# Patient Record
Sex: MALE | Race: WHITE | URBAN - NONMETROPOLITAN AREA
[De-identification: names, ages, dates, MRNs, and addresses within clinical notes are randomized per-mention and may not be internally consistent; named-entity substitution may affect disease eponyms.]

---

## 2023-03-15 ENCOUNTER — OFFICE VISIT (OUTPATIENT)
Dept: NEPHROLOGY | Age: 63
End: 2023-03-15
Payer: COMMERCIAL

## 2023-03-15 VITALS
WEIGHT: 227 LBS | DIASTOLIC BLOOD PRESSURE: 85 MMHG | OXYGEN SATURATION: 97 % | TEMPERATURE: 98 F | SYSTOLIC BLOOD PRESSURE: 138 MMHG | HEART RATE: 97 BPM

## 2023-03-15 DIAGNOSIS — F19.90 EXCESSIVE USE OF NONSTEROIDAL ANTI-INFLAMMATORY DRUG (NSAID): ICD-10-CM

## 2023-03-15 DIAGNOSIS — I12.9 HYPERTENSIVE RENAL DISEASE, STAGE 1 THROUGH STAGE 4 OR UNSPECIFIED CHRONIC KIDNEY DISEASE: ICD-10-CM

## 2023-03-15 DIAGNOSIS — N18.31 STAGE 3A CHRONIC KIDNEY DISEASE (HCC): Primary | ICD-10-CM

## 2023-03-15 DIAGNOSIS — N20.0 KIDNEY STONE: ICD-10-CM

## 2023-03-15 PROBLEM — E11.9 TYPE 2 DIABETES MELLITUS WITHOUT COMPLICATION, WITHOUT LONG-TERM CURRENT USE OF INSULIN (HCC): Status: ACTIVE | Noted: 2017-04-25

## 2023-03-15 PROBLEM — E65 LIPOHYPERTROPHY DUE TO INSULIN INJECTION: Status: ACTIVE | Noted: 2022-10-29

## 2023-03-15 PROBLEM — T80.89XA LIPOHYPERTROPHY DUE TO INSULIN INJECTION: Status: ACTIVE | Noted: 2022-10-29

## 2023-03-15 PROBLEM — K86.2 PANCREAS CYST: Status: ACTIVE | Noted: 2021-10-27

## 2023-03-15 PROBLEM — M96.1 POSTLAMINECTOMY SYNDROME, LUMBAR REGION: Status: ACTIVE | Noted: 2022-09-26

## 2023-03-15 PROBLEM — G47.01 INSOMNIA SECONDARY TO CHRONIC PAIN: Status: ACTIVE | Noted: 2022-09-26

## 2023-03-15 PROBLEM — M79.2 NEURALGIA: Status: ACTIVE | Noted: 2022-11-07

## 2023-03-15 PROBLEM — M19.90 OSTEOARTHROSIS: Status: ACTIVE | Noted: 2017-04-25

## 2023-03-15 PROBLEM — K21.9 GASTROESOPHAGEAL REFLUX DISEASE: Status: ACTIVE | Noted: 2022-09-28

## 2023-03-15 PROBLEM — N18.30 STAGE 3 CHRONIC KIDNEY DISEASE (HCC): Status: ACTIVE | Noted: 2022-10-10

## 2023-03-15 PROBLEM — G89.29 INSOMNIA SECONDARY TO CHRONIC PAIN: Status: ACTIVE | Noted: 2022-09-26

## 2023-03-15 PROCEDURE — 3079F DIAST BP 80-89 MM HG: CPT | Performed by: INTERNAL MEDICINE

## 2023-03-15 PROCEDURE — 99204 OFFICE O/P NEW MOD 45 MIN: CPT | Performed by: INTERNAL MEDICINE

## 2023-03-15 PROCEDURE — 3075F SYST BP GE 130 - 139MM HG: CPT | Performed by: INTERNAL MEDICINE

## 2023-03-15 RX ORDER — BLOOD-GLUCOSE TRANSMITTER
EACH MISCELLANEOUS
COMMUNITY
Start: 2023-02-01

## 2023-03-15 RX ORDER — TAMSULOSIN HYDROCHLORIDE 0.4 MG/1
CAPSULE ORAL
COMMUNITY
Start: 2021-12-30

## 2023-03-15 RX ORDER — M-VIT,TX,IRON,MINS/CALC/FOLIC 27MG-0.4MG
1 TABLET ORAL DAILY
COMMUNITY

## 2023-03-15 RX ORDER — MELOXICAM 7.5 MG/1
TABLET ORAL
COMMUNITY
Start: 2023-01-26

## 2023-03-15 RX ORDER — GLIMEPIRIDE 2 MG/1
TABLET ORAL
COMMUNITY
Start: 2022-08-08

## 2023-03-15 RX ORDER — AMLODIPINE BESYLATE 5 MG/1
TABLET ORAL
COMMUNITY
Start: 2023-02-20

## 2023-03-15 RX ORDER — BENAZEPRIL HYDROCHLORIDE 20 MG/1
TABLET ORAL DAILY
COMMUNITY
Start: 2023-02-08

## 2023-03-15 RX ORDER — BLOOD-GLUCOSE SENSOR
EACH MISCELLANEOUS
COMMUNITY
Start: 2023-03-01

## 2023-03-15 RX ORDER — GABAPENTIN 300 MG/1
CAPSULE ORAL 3 TIMES DAILY
COMMUNITY

## 2023-03-15 RX ORDER — ATORVASTATIN CALCIUM 10 MG/1
TABLET, FILM COATED ORAL DAILY
COMMUNITY

## 2023-03-15 RX ORDER — OXYCODONE HYDROCHLORIDE AND ACETAMINOPHEN 5; 325 MG/1; MG/1
TABLET ORAL
COMMUNITY
Start: 2023-02-18

## 2023-03-15 RX ORDER — LEVOTHYROXINE SODIUM 0.07 MG/1
TABLET ORAL DAILY
COMMUNITY
Start: 2021-11-04

## 2023-03-15 RX ORDER — INSULIN DETEMIR 100 [IU]/ML
INJECTION, SOLUTION SUBCUTANEOUS 2 TIMES DAILY
COMMUNITY
Start: 2023-02-08

## 2023-03-15 RX ORDER — PANTOPRAZOLE SODIUM 40 MG/1
TABLET, DELAYED RELEASE ORAL
COMMUNITY
Start: 2021-12-30

## 2023-03-15 RX ORDER — POTASSIUM CITRATE 10 MEQ/1
TABLET, EXTENDED RELEASE ORAL
COMMUNITY
Start: 2022-12-13

## 2023-03-15 RX ORDER — DULAGLUTIDE 3 MG/.5ML
INJECTION, SOLUTION SUBCUTANEOUS
COMMUNITY
Start: 2023-03-06

## 2023-03-15 RX ORDER — INSULIN LISPRO 100 [IU]/ML
INJECTION, SOLUTION INTRAVENOUS; SUBCUTANEOUS
COMMUNITY
Start: 2023-02-08

## 2023-03-15 NOTE — PROGRESS NOTES
[address] [facility] [address]  Dept: [phone]  Loc: [phone]  Outpatient Consult Note  3/15/2023 3:40 PM        Pt Name:    Juan Carlos Ken:    1960  Primary Care Physician:  Milton Quinteros     Chief Complaint:   Chief Complaint   Patient presents with    New Patient     Dr. Liliana Pro renal dysfunction        Background Information/HPI   The patient is a 60 y.o. with hx HTN, DM who is here for initial evaluation of renal dysfunction. Patient reports hx kidney stones s/p lithotripsy. He has had ureteral stents and saw urology in the past. Has DM for 16+ years. No hx known heart problems. No hx smoking. No hx CVA. He has hx pancreas cyst- [facility]. No recent hematuria. Has some leg swelling. Was drinking some beers in the past but no longer. He has had radiation therapy for ?cancer in the past- but unable to recall details at this time. No NSAID intake. He works at [employer]. He takes potassium citrate 10 meq 4 tabs BID. He says he takes potassium citrate --has been taking it since 1990s ever since he had kidney stones surgery. Also taken lotensin, norvasc. Also takes mobic daily. He says he started taking it about a month for arthritis. Has lot of arthritis in hands. Allergies:  Patient has no known allergies.         Past Medical History:  Past Medical History:   Diagnosis Date    Cancer ([facility] [address]     Chronic kidney disease     Diabetes mellitus ([facility] [address]     Hypertension         Past Surgical History:  Past Surgical History:   Procedure Laterality Date    BACK SURGERY      CARPAL TUNNEL RELEASE      CERVICAL FUSION      EAR SURGERY Left     x4    FINGER TRIGGER RELEASE      PERCUTANEOUS NEPHROLITHOTRIPSY          Family History:  Family History   Problem Relation Age of Onset    Cancer Mother     Cancer Father     Cancer Maternal Aunt     Cancer Maternal Uncle     Cancer Paternal Uncle     Cancer Maternal Grandmother Cancer Maternal Grandfather         Social History:  Social History     Socioeconomic History    Marital status:      Spouse name: Not on file    Number of children: Not on file    Years of education: Not on file    Highest education level: Not on file   Occupational History    Not on file   Tobacco Use    Smoking status: Never    Smokeless tobacco: Never   Substance and Sexual Activity    Alcohol use: Not on file    Drug use: Not on file    Sexual activity: Not on file   Other Topics Concern    Not on file   Social History Narrative    Not on file     Social Determinants of Health     Financial Resource Strain: Not on file   Food Insecurity: Not on file   Transportation Needs: Not on file   Physical Activity: Not on file   Stress: Not on file   Social Connections: Not on file   Intimate Partner Violence: Not on file   Housing Stability: Not on file        Review of Systems:  Constitutional: no fever or chills  Head: No headaches  Eyes: no blurry vision, no discharge  Ears: no ear pain or hearing changes  Nose: no runny nose or epistaxis  Respiratory: no shortness of breath or cough or sputum production  Cardiovascular: no chest pain  GI: no nausea, no vomiting or diarrhea  : denies any discharge  Skin: no rash  Musculoskeletal: + arthritis pain,  moves all ext  Neuro: no numbness or tingling, no slurred speech  Psychiatric: stable mood, no depression or insomnia    All other review of systems were reviewed and negative     Home Medications:  Prior to Admission medications    Medication Sig Start Date End Date Taking?  Authorizing Provider   amLODIPine (NORVASC) 5 MG tablet TAKE 1 TABLET BY MOUTH EVERY DAY 2/20/23  Yes Historical Provider, MD   atorvastatin (LIPITOR) 10 MG tablet Take by mouth daily   Yes Historical Provider, MD   benazepril (LOTENSIN) 20 MG tablet Take by mouth daily 2/8/23  Yes Historical Provider, MD   Calcium 250 MG CAPS Take by mouth   Yes Historical Provider, MD   Continuous Blood Gluc Sensor (DEXCOM G6 SENSOR) MISC  3/1/23  Yes Historical Provider, MD   Continuous Blood Gluc Transmit (DEXCOM G6 TRANSMITTER) MISC  2/1/23  Yes Historical Provider, MD   gabapentin (NEURONTIN) 300 MG capsule Take by mouth 3 times daily. Yes Historical Provider, MD   glimepiride (AMARYL) 2 MG tablet Two and one half tabs bid 8/8/22  Yes Historical Provider, MD   insulin lispro, 1 Unit Dial, (HUMALOG/ADMELOG) 100 UNIT/ML SOPN Take 10 units plus medium dose sliding scale (0-12units). Max 60 units.  2/8/23  Yes Historical Provider, MD   empagliflozin (JARDIANCE) 25 MG tablet TAKE 1 TABLET BY MOUTH EVERY DAY 3/18/22  Yes Historical Provider, MD   insulin detemir (LEVEMIR FLEXTOUCH) 100 UNIT/ML injection pen Inject into the skin 2 times daily 2/8/23  Yes Historical Provider, MD   meloxicam (MOBIC) 7.5 MG tablet TAKE 1 TABLET BY MOUTH EVERY DAY FOR 90 DAYS 1/26/23  Yes Historical Provider, MD   oxyCODONE-acetaminophen (PERCOCET) 5-325 MG per tablet TAKE 1 TABLET BY MOUTH EVERY 8 HOURS AS NEEDED FOR PAIN *MAY FILL 2/10* 2/18/23  Yes Historical Provider, MD   pantoprazole (PROTONIX) 40 MG tablet TAKE 1 TABLET DAILY 12/30/21  Yes Historical Provider, MD   potassium citrate (UROCIT-K) 10 MEQ (1080 MG) extended release tablet TAKE 4 TABLETS TWICE A DAY 12/13/22  Yes Historical Provider, MD   tamsulosin (FLOMAX) 0.4 MG capsule TAKE 1 CAPSULE DAILY 12/30/21  Yes Historical Provider, MD   TRULICITY 3 ZD/9.2MR SOPN INJECT 3 MG UNDER THE SKIN ONCE A WEEK. 3/6/23  Yes Historical Provider, MD   Multiple Vitamins-Minerals (THERAPEUTIC MULTIVITAMIN-MINERALS) tablet Take 1 tablet by mouth daily   Yes Historical Provider, MD   levothyroxine (SYNTHROID) 75 MCG tablet Take by mouth daily 11/4/21  Yes Historical Provider, MD        Physical Examination:  VITALS:  /85 (Site: Left Upper Arm, Position: Sitting, Cuff Size: Medium Adult)   Pulse 97   Temp 98 °F (36.7 °C)   Wt 227 lb (103 kg)   SpO2 97%   There is no height or weight on file to calculate BMI. Wt Readings from Last 3 Encounters:   03/15/23 227 lb (103 kg)     Constitutional and General Appearance: alert and cooperative with exam, appears comfortable, no distress, not diaphoretic  Eyes: no icteric sclera in left eye or right eye,  no pallor conjunctiva in left or right eye, no discharge seen from left eye or right eye  Ears and Nose: normal external appearance of left and right ear  Oral: moist oral mucus membranes  Neck: No jugular venous distention  Lungs: Air entry B/L, no crackles or rales  Chest: No chest wall tenderness  Heart: regular rate, S1, S2  Extremities: no pitting LE edema, no tenderness  GI: soft, non-tender, no guarding, no distention  Skin: no rash seen on exposed extremities  Musculo: moves all extremities  Neuro: no slurred speech, no facial drooping  Psychiatric: Normal mood and affect, Not agitated     Laboratory & Diagnostics:  Old progress notes from referring physician reviewed. Radiology/US kidneys:   Echo:   Old labs reviewed:  October 2022: AIC 10.2% (was over 12% in the past), creat 1.7  Feb 2023: Creat 1.8, K 5.0     Impression/Plan:   1. CKD IIIa: in setting of HTN/DM and NSAID use. He has been on mobic. He is on insulin for DM. He is on norvasc and lotensin. Advised good sugar control and BP control to minimize risk of CKD progression. Discussed in detail with patient. He says sugars are in 200 range at times. Advised weight loss. Advised low salt diet. Says sugars have been in 400-500 range in the past. Check UA, UPCR and kidney U/S to check echogenicity and kidney size. 2. HTN: on norvasc and lotensin. Advised low salt diet, weight loss  3. IDDM: on jardiance, amaryl, insulin  4 Hx kidney stones: check kidney U/S, 24 hr litholink, advised low salt diet. Advised low red meat. Increase water intake. Advised weight loss. Check uric acid. He is on potassium citrate  5. Pancreatic cyst  6. NSAID intake: discussed renal side effects.  He has cut back on mobic to 7.5 mg daily. Discussed in detail, minimize use as much possible. Thought process was discussed with the patient  Thank you for the referral  Please do not hesitate to contact me if you have any questions or concerns  I will make further recommendations depending on clinical course and lab/diagnostic results    Orders Placed This Encounter   Procedures    US RENAL COMPLETE    Basic Metabolic Panel    CBC    PTH, Intact    Phosphorus    Uric Acid    Vitamin D 25 Hydroxy    Urinalysis    Protein / creatinine ratio, urine    LITHOLINK     Return in about 2 months (around 5/15/2023).       Manohar Morse MD  [facility]

## 2023-03-28 DIAGNOSIS — N18.31 STAGE 3A CHRONIC KIDNEY DISEASE (HCC): ICD-10-CM

## 2023-05-31 ENCOUNTER — OFFICE VISIT (OUTPATIENT)
Dept: NEPHROLOGY | Age: 63
End: 2023-05-31
Payer: COMMERCIAL

## 2023-05-31 VITALS
WEIGHT: 226 LBS | SYSTOLIC BLOOD PRESSURE: 107 MMHG | BODY MASS INDEX: 36.48 KG/M2 | DIASTOLIC BLOOD PRESSURE: 75 MMHG | HEART RATE: 84 BPM | OXYGEN SATURATION: 95 %

## 2023-05-31 DIAGNOSIS — N18.31 CHRONIC KIDNEY DISEASE, STAGE 3A (HCC): Primary | ICD-10-CM

## 2023-05-31 DIAGNOSIS — I12.9 HYPERTENSIVE RENAL DISEASE, STAGE 1 THROUGH STAGE 4 OR UNSPECIFIED CHRONIC KIDNEY DISEASE: ICD-10-CM

## 2023-05-31 PROCEDURE — 3078F DIAST BP <80 MM HG: CPT | Performed by: INTERNAL MEDICINE

## 2023-05-31 PROCEDURE — 3074F SYST BP LT 130 MM HG: CPT | Performed by: INTERNAL MEDICINE

## 2023-05-31 PROCEDURE — 99214 OFFICE O/P EST MOD 30 MIN: CPT | Performed by: INTERNAL MEDICINE

## 2023-05-31 RX ORDER — POTASSIUM CITRATE 10 MEQ/1
TABLET, EXTENDED RELEASE ORAL
Qty: 720 TABLET | Refills: 3 | Status: CANCELLED | OUTPATIENT
Start: 2023-05-31

## 2023-05-31 RX ORDER — POTASSIUM CITRATE 10 MEQ/1
20 TABLET, EXTENDED RELEASE ORAL 2 TIMES DAILY
Qty: 120 TABLET | Refills: 3 | Status: SHIPPED | OUTPATIENT
Start: 2023-05-31

## 2023-05-31 RX ORDER — TAMSULOSIN HYDROCHLORIDE 0.4 MG/1
0.4 CAPSULE ORAL DAILY
Qty: 90 CAPSULE | Refills: 3 | Status: CANCELLED | OUTPATIENT
Start: 2023-05-31

## 2023-05-31 RX ORDER — FUROSEMIDE 20 MG/1
20 TABLET ORAL DAILY
Qty: 90 TABLET | Refills: 3 | Status: SHIPPED | OUTPATIENT
Start: 2023-05-31

## 2023-05-31 NOTE — PROGRESS NOTES
[address] [facility] [address] [place] [address]  Dept: [phone]  Loc: [phone]  Office Progress Note  5/31/2023 3:34 PM      Pt Name:    Fady Blue:    1960  Primary Care Physician:  Clarissa Crawford     Chief Complaint:   Chief Complaint   Patient presents with    Follow-up     2 months follow-up for CKD and HTN, DM        Background Information/Interval History:   The patient is a 60 y.o. with hx HTN, DM who is here for follow-up evaluation of renal dysfunction. Patient reports hx kidney stones s/p lithotripsy. He has had ureteral stents and saw urology in the past. Has DM for 16+ years. He has hx pancreas cyst- [facility]. Was drinking some beers in the past but no longer. He has had radiation therapy for ?cancer (oral) in the past- but unable to recall details at this time. He takes potassium citrate 10 meq 4 tabs BID. He says he takes potassium citrate --has been taking it since 1990s ever since he had kidney stones surgery. Also taken lotensin, norvasc. Also takes mobic daily. He says he started taking it about a month for arthritis. Has lot of arthritis in hands. here for 2 months follow-up. Says his sugars were running high due to not able to eat properly- wife was ill. BP is stable. Reports mild leg swelling.       Past History:  Past Medical History:   Diagnosis Date    Cancer ([facility] [address]     Chronic kidney disease     Diabetes mellitus ([address]     Hypertension      Past Surgical History:   Procedure Laterality Date    BACK SURGERY      CARPAL TUNNEL RELEASE      CERVICAL FUSION      EAR SURGERY Left     x4    FINGER TRIGGER RELEASE      PERCUTANEOUS NEPHROLITHOTRIPSY          VITALS:  /75 (Site: Right Upper Arm, Position: Sitting, Cuff Size: Large Adult)   Pulse 84   Wt 226 lb (102.5 kg)   SpO2 95%   BMI 36.48 kg/m²   Wt Readings from Last 3 Encounters:   05/31/23 226 lb (102.5 kg)   03/20/23 225 lb 9.6

## 2023-07-03 RX ORDER — POTASSIUM CITRATE 10 MEQ/1
20 TABLET, EXTENDED RELEASE ORAL 2 TIMES DAILY
Qty: 120 TABLET | Refills: 5 | Status: SHIPPED | OUTPATIENT
Start: 2023-07-03

## 2023-10-27 LAB
PTH INTACT: 45
PTH INTACT: NORMAL

## 2023-12-06 ENCOUNTER — OFFICE VISIT (OUTPATIENT)
Dept: NEPHROLOGY | Age: 63
End: 2023-12-06
Payer: COMMERCIAL

## 2023-12-06 VITALS
BODY MASS INDEX: 37.93 KG/M2 | WEIGHT: 236 LBS | SYSTOLIC BLOOD PRESSURE: 123 MMHG | DIASTOLIC BLOOD PRESSURE: 74 MMHG | OXYGEN SATURATION: 97 % | HEIGHT: 66 IN | HEART RATE: 94 BPM

## 2023-12-06 DIAGNOSIS — F19.90 EXCESSIVE USE OF NONSTEROIDAL ANTI-INFLAMMATORY DRUG (NSAID): ICD-10-CM

## 2023-12-06 DIAGNOSIS — N18.32 CHRONIC KIDNEY DISEASE, STAGE 3B (HCC): Primary | ICD-10-CM

## 2023-12-06 DIAGNOSIS — I12.9 HYPERTENSIVE RENAL DISEASE, STAGE 1 THROUGH STAGE 4 OR UNSPECIFIED CHRONIC KIDNEY DISEASE: ICD-10-CM

## 2023-12-06 PROCEDURE — 3074F SYST BP LT 130 MM HG: CPT | Performed by: INTERNAL MEDICINE

## 2023-12-06 PROCEDURE — 3078F DIAST BP <80 MM HG: CPT | Performed by: INTERNAL MEDICINE

## 2023-12-06 PROCEDURE — 99214 OFFICE O/P EST MOD 30 MIN: CPT | Performed by: INTERNAL MEDICINE

## 2023-12-06 RX ORDER — FUROSEMIDE 40 MG/1
40 TABLET ORAL DAILY
Qty: 90 TABLET | Refills: 3 | Status: SHIPPED | OUTPATIENT
Start: 2023-12-06

## 2023-12-06 NOTE — PROGRESS NOTES
Return in about 6 months (around 6/6/2024).       Vincent Mccrary MD  [facility]

## 2023-12-13 ENCOUNTER — OFFICE VISIT (OUTPATIENT)
Age: 63
End: 2023-12-13
Payer: COMMERCIAL

## 2023-12-13 VITALS
BODY MASS INDEX: 38.54 KG/M2 | DIASTOLIC BLOOD PRESSURE: 89 MMHG | HEART RATE: 78 BPM | SYSTOLIC BLOOD PRESSURE: 138 MMHG | HEIGHT: 66 IN | WEIGHT: 239.8 LBS | RESPIRATION RATE: 16 BRPM

## 2023-12-13 DIAGNOSIS — E11.65 TYPE 2 DIABETES MELLITUS WITH HYPERGLYCEMIA, WITHOUT LONG-TERM CURRENT USE OF INSULIN (HCC): Primary | ICD-10-CM

## 2023-12-13 PROCEDURE — 99215 OFFICE O/P EST HI 40 MIN: CPT | Performed by: INTERNAL MEDICINE

## 2023-12-13 PROCEDURE — 3079F DIAST BP 80-89 MM HG: CPT | Performed by: INTERNAL MEDICINE

## 2023-12-13 PROCEDURE — 3046F HEMOGLOBIN A1C LEVEL >9.0%: CPT | Performed by: INTERNAL MEDICINE

## 2023-12-13 PROCEDURE — 3075F SYST BP GE 130 - 139MM HG: CPT | Performed by: INTERNAL MEDICINE

## 2023-12-13 PROCEDURE — 95251 CONT GLUC MNTR ANALYSIS I&R: CPT | Performed by: INTERNAL MEDICINE

## 2023-12-13 RX ORDER — POTASSIUM CITRATE 10 MEQ/1
20 TABLET, EXTENDED RELEASE ORAL 2 TIMES DAILY
Qty: 120 TABLET | Refills: 5 | Status: CANCELLED | OUTPATIENT
Start: 2023-12-13

## 2023-12-13 RX ORDER — INSULIN LISPRO 100 [IU]/ML
INJECTION, SOLUTION INTRAVENOUS; SUBCUTANEOUS
Qty: 45 ADJUSTABLE DOSE PRE-FILLED PEN SYRINGE | Refills: 3 | Status: CANCELLED | OUTPATIENT
Start: 2023-12-13

## 2023-12-13 RX ORDER — GLIMEPIRIDE 4 MG/1
4 TABLET ORAL 2 TIMES DAILY
Qty: 180 TABLET | Refills: 1 | Status: SHIPPED | OUTPATIENT
Start: 2023-12-13

## 2023-12-13 RX ORDER — LEVOTHYROXINE SODIUM 0.07 MG/1
75 TABLET ORAL DAILY
Qty: 90 TABLET | Refills: 1 | Status: SHIPPED | OUTPATIENT
Start: 2023-12-13

## 2023-12-13 NOTE — PATIENT INSTRUCTIONS
Take LEVEMIR 96 units twice a day     Take mealtime HUMALOG as follows:  Breakfast: 44 units  Lunch: 44 units  Dinner: 44 units     Plus sliding scale HUMALOG as follows:  Below 70, treat for hypoglycemia  , no additional insulin  151-200, 5 units  201-250, 10 units  251-300, 15 units  301-350, 20 units  351-400, 25 units  Above 400, call MD     Send blood sugars in 1-2 weeks.

## 2023-12-13 NOTE — PROGRESS NOTES
CGMSINTERPRETATION    CGMS interpretation:  The patient is checking blood sugars 4 times a day using Dexcom continuous glucose monitoring system. His download includes data from 11/30/2023 through 12/13/2023 and is sufficient for interpretation. The CGM was active 99.7% of the time. Average blood glucose is 291 mg/dL with a coefficient of variation of 32.0% and glucose management indicator of 10.3%  The patient was within the target range 17 percent of the time. Hypoglycemia:  Blood glucose range between 54 mg/dL and 70mg/dL: 0%  Blood glucose is below 54 mg/dL: 0%  Hyperglycemia:  Blood glucoses between 181 and 250 mg/dL: 17%  Blood glucoses above 250 mg/dL: 66%  In summary, this patient is having hyperglycemia all day. Recommendations: Please refer to the assessment and plan section.
Take LEVEMIR 96 units twice a day     Take mealtime HUMALOG as follows:  Breakfast: 44 units  Lunch: 44 units  Dinner: 44 units     Plus sliding scale HUMALOG as follows:  Below 70, treat for hypoglycemia  , no additional insulin  151-200, 5 units  201-250, 10 units  251-300, 15 units  301-350, 20 units  351-400, 25 units  Above 400, call MD     Send blood sugars in 1-2 weeks.
diabetes mellitus with hyperglycemia, without long-term current use of insulin (HCC)  Uncontrolled. I have converted him to U200 Humalog and adjusted the doses. Blood sugars in 2 weeks. Hypoglycemia treatment discussed. I have given him a referral to the diabetes management clinic. If needed we will transition him to U-500 as the neck step. We could consider using concentrated insulin in pump therapy off label. At the same time I have spoken with his gastroenterologist at [facility] who feels that GLP-1 agonist treatment is still reasonable in spite of the pancreatic problem. This can be initiated after review of his blood sugar and response to changes from today. Time: It took me a total of 44 minutes to complete previsit chart review, face-to-face encounter, medical decision making, care coordination, order placement and charting. This does not include the time spent on CGMS interpretation. Orders Placed This Encounter   Procedures    AMB REFERRAL TO DIABETES CLINIC     Referral Priority:   Routine     Referral Type:   Eval and Treat     Referral Reason:   Specialty Services Required     Referred to Provider:   Jourdan Morgan RN     Number of Visits Requested:   1         The risks and benefits of my recommendations, as well as other treatment options were discussed with the patient today. Questions were answered. Follow up: 3 month and as needed. Electronically signed by Carol Burnett MD 12/13/2023 10:59 AM     **This report has been created using voice recognition software. It may   contain minor errors which are inherent in voice recognition technology. **

## 2024-01-09 DIAGNOSIS — E11.65 TYPE 2 DIABETES MELLITUS WITH HYPERGLYCEMIA, WITHOUT LONG-TERM CURRENT USE OF INSULIN (HCC): ICD-10-CM

## 2024-01-16 DIAGNOSIS — E11.65 TYPE 2 DIABETES MELLITUS WITH HYPERGLYCEMIA, WITHOUT LONG-TERM CURRENT USE OF INSULIN (HCC): ICD-10-CM

## 2024-01-18 ENCOUNTER — TELEPHONE (OUTPATIENT)
Age: 64
End: 2024-01-18

## 2024-01-18 DIAGNOSIS — E11.65 TYPE 2 DIABETES MELLITUS WITH HYPERGLYCEMIA, WITHOUT LONG-TERM CURRENT USE OF INSULIN (HCC): ICD-10-CM

## 2024-01-18 NOTE — TELEPHONE ENCOUNTER
Patient called in regarding humalog refill, this was sent to [facility] rx on 1/9. He does not use this pharmacy anymore and request it be [facility] [place]. Can you please [facility] for patient?

## 2024-01-31 ENCOUNTER — OFFICE VISIT (OUTPATIENT)
Dept: INTERNAL MEDICINE CLINIC | Age: 64
End: 2024-01-31
Payer: COMMERCIAL

## 2024-01-31 VITALS
BODY MASS INDEX: 38.31 KG/M2 | TEMPERATURE: 98.2 F | DIASTOLIC BLOOD PRESSURE: 62 MMHG | HEIGHT: 66 IN | WEIGHT: 238.4 LBS | HEART RATE: 72 BPM | SYSTOLIC BLOOD PRESSURE: 114 MMHG

## 2024-01-31 DIAGNOSIS — E11.9 TYPE 2 DIABETES MELLITUS WITHOUT COMPLICATION, WITHOUT LONG-TERM CURRENT USE OF INSULIN (HCC): Primary | ICD-10-CM

## 2024-01-31 LAB — HBA1C MFR BLD: 11.1 % (ref 4.3–5.7)

## 2024-01-31 PROCEDURE — 83036 HEMOGLOBIN GLYCOSYLATED A1C: CPT | Performed by: REGISTERED NURSE

## 2024-01-31 PROCEDURE — NBSRV NON-BILLABLE SERVICE: Performed by: REGISTERED NURSE

## 2024-01-31 PROCEDURE — G0108 DIAB MANAGE TRN  PER INDIV: HCPCS | Performed by: REGISTERED NURSE

## 2024-01-31 RX ORDER — KETOCONAZOLE 20 MG/G
1 CREAM TOPICAL DAILY
COMMUNITY
Start: 2023-10-05

## 2024-01-31 RX ORDER — FLUOROURACIL 50 MG/G
CREAM TOPICAL 2 TIMES DAILY
COMMUNITY

## 2024-01-31 RX ORDER — ATORVASTATIN CALCIUM 20 MG/1
20 TABLET, FILM COATED ORAL DAILY
COMMUNITY
Start: 2023-11-26

## 2024-01-31 RX ORDER — KETOCONAZOLE 20 MG/ML
SHAMPOO TOPICAL DAILY
COMMUNITY
Start: 2023-10-05

## 2024-01-31 ASSESSMENT — PATIENT HEALTH QUESTIONNAIRE - PHQ9
1. LITTLE INTEREST OR PLEASURE IN DOING THINGS: 1
2. FEELING DOWN, DEPRESSED OR HOPELESS: 0
SUM OF ALL RESPONSES TO PHQ QUESTIONS 1-9: 1
SUM OF ALL RESPONSES TO PHQ9 QUESTIONS 1 & 2: 1
SUM OF ALL RESPONSES TO PHQ QUESTIONS 1-9: 1

## 2024-01-31 NOTE — PATIENT INSTRUCTIONS
Capsaicin cream (pepper cream) could help keep foot/                 feet pain  Try to get 10 minutes aerobic exercise every day            Damaso Pham 10 minute chair workout  Keep track or what you are eating           Limit a snack to a single carbohydrate                   1 handful chips                   5 Triscuits                   1 handful nuts                 *15 grams carbohydrate  Focus on limiting carbohydrates at meals to about                  3 servings (45-50 grams)                   Limit snacks to only 1 serving carb or 15 grams              Plus protein/ low carb vegetables  We will talk to Dr. Gustafson about the Trulicity

## 2024-01-31 NOTE — PROGRESS NOTES
[facility]  [address] [place] [address]  [phone] (phone)  [phone] (fax)    Patient ID: Rich Grayson 1960  Referring Provider: Dr. Gustafson     Diabetes Mellitus Type 2, Initial Visit: Patient here for an initial evaluation of Type 2 diabetes mellitus. Last c-peptide was 6/30/2024  = 5.59.  Current symptoms/problems include polyuria and neuropathy in feet bilat and have been unchanged. Symptoms have been present for several months.    The patient was initially diagnosed with Type 2 diabetes mellitus since 2007.    Known diabetic complications: peripheral neuropathy  Cardiovascular risk factors: advanced age (older than 55 for men, 65 for women), diabetes mellitus, dyslipidemia, hypertension, male gender, obesity (BMI >= 30 kg/m2), and sedentary lifestyle  Family history of diabetes: mat grandfather; brother  Weight trend: up 25 pounds after trigger finger surgery/ achiles heal repair and wife being sick for 1-2 months    Current Diabetes Pharmacotherapy:  Levemir 96 units morning and night  Humalog 44 with each meal plus group 5 scale  Glimepiride 4mg 2 times daily  Jardiance 25mg daily  Injection technique/storage: appropriate  stomach--yes    Glucose Trends:   Glucose at 1 hrs PPD today resulted at 397mg/dl  Current monitoring regimen: Dexcom CGM - checks 4+ times daily  Home blood sugar trends:    -V. High: 63%, High: 23%, Target: 14%, Low: 0%, V. Low: 0%   -Average glucose: 293mg/dL; GMI: --%; Sensor usage 79%   -levels drop closet to goal by 4am until 8am. Levels then rise   Any episodes of hypoglycemia? no    Lifestyle Factors:   Previous visit with dietician: remote  Current diet: B: 3 pieces butter toast            L: 1 biscuit/ gravy  Humalog 65 units                       D: large  salad/ 3 T. Dressing; ham; egg/ veggies                       Snacks: rare triscuits or nuts n cheese                       Beverages: coffee 3 cups per day (half n half)  Current

## 2024-02-01 ENCOUNTER — TELEPHONE (OUTPATIENT)
Dept: INTERNAL MEDICINE CLINIC | Age: 64
End: 2024-02-01

## 2024-02-01 ENCOUNTER — CLINICAL DOCUMENTATION (OUTPATIENT)
Age: 64
End: 2024-02-01

## 2024-02-01 DIAGNOSIS — E11.65 TYPE 2 DIABETES MELLITUS WITH HYPERGLYCEMIA, WITHOUT LONG-TERM CURRENT USE OF INSULIN (HCC): Primary | ICD-10-CM

## 2024-02-01 RX ORDER — DULAGLUTIDE 1.5 MG/.5ML
1.5 INJECTION, SOLUTION SUBCUTANEOUS WEEKLY
Qty: 4 ADJUSTABLE DOSE PRE-FILLED PEN SYRINGE | Refills: 0 | Status: SHIPPED | OUTPATIENT
Start: 2024-02-01

## 2024-02-01 RX ORDER — DULAGLUTIDE 0.75 MG/.5ML
0.75 INJECTION, SOLUTION SUBCUTANEOUS WEEKLY
Qty: 2 ADJUSTABLE DOSE PRE-FILLED PEN SYRINGE | Refills: 0 | Status: SHIPPED | OUTPATIENT
Start: 2024-02-01

## 2024-02-01 NOTE — TELEPHONE ENCOUNTER
Current Diabetes Pharmacotherapy:  Levemir 96 units morning and night  Humalog U200   44 with each meal plus group 5 scale  Glimepiride 4mg 2 times daily  Jardiance 25mg daily  Injection technique/storage: appropriate  stomach--yes  Trulicity 3mg weekly --has not taken for 2-3 months     Glucose Trends:   Glucose at 1 hrs PPD today resulted at 397mg/dl  Current monitoring regimen: Dexcom CGM - checks 4+ times daily  Home blood sugar trends:               -V. High: 63%, High: 23%, Target: 14%, Low: 0%, V. Low: 0%              -Average glucose: 293mg/dL; GMI: --%; Sensor usage 79%              -levels drop closet to goal by 4am until 8am. Levels then rise   Any episodes of hypoglycemia? no    A1C 1/31/2024 11.1%    Garcia was seen in the clinic 1/31/2024. He is very anxious about his high blood sugars and getting the Trulicity back in place. He reports that the gastroenterologist specialist has ok'd the return to Trulicity therapy that he had been taking. Also noted in Dr. Gustafson's note that he will likely proceed with this plan.  Garcia was on Trulicity 3mg weekly and wants to resume the Trulicity at this dose. We reviewed the normal titration and possible pancreatic risks with resuming at the advanced dose. He is adamant that 3mg is where he started before and wants to start again. We could consider a rapid titration that would possibly appease him more and maintain safety.  *Garcia has several months of 3mg Trulicity in his fridge, lending to his desire to get to the               3mg dose quicker.    Titration possibility:  Trulicity 1.5mg for 2 weeks; double 3rd/4th dose; proceed to 3mg weekly.                Or Trulicity 1.5mg for 4 weeks and then increase 3mg weekly.    Dr. Gustafson,      Please authorize the Diabetes Clinic at [facility] to teach/ assist Garcia to resume  his Trulicity therapy. If you agree, would you authorize starting at 1.5mg weekly and would you be willing to titrate up on the 3rd week or prefer to

## 2024-02-01 NOTE — PROGRESS NOTES
I spoke with patient regarding restarting Trulicity.  This has been cleared by his gastroenterologist at [facility].  We will start Trulicity 0.75 mg weekly x 2 doses, followed by 1.5 mg x 4 doses before going back to 3 mg weekly.  I have sent the prescriptions.

## 2024-02-06 DIAGNOSIS — E11.65 TYPE 2 DIABETES MELLITUS WITH HYPERGLYCEMIA, WITHOUT LONG-TERM CURRENT USE OF INSULIN (HCC): ICD-10-CM

## 2024-02-07 DIAGNOSIS — N18.32 CHRONIC KIDNEY DISEASE, STAGE 3B (HCC): Primary | ICD-10-CM

## 2024-02-07 DIAGNOSIS — E87.5 HYPERKALEMIA: ICD-10-CM

## 2024-02-07 RX ORDER — POTASSIUM CITRATE 10 MEQ/1
20 TABLET, EXTENDED RELEASE ORAL 2 TIMES DAILY
Qty: 360 TABLET | Refills: 3 | Status: CANCELLED | OUTPATIENT
Start: 2024-02-07

## 2024-02-07 RX ORDER — FUROSEMIDE 40 MG/1
40 TABLET ORAL DAILY
Qty: 90 TABLET | Refills: 3 | Status: CANCELLED | OUTPATIENT
Start: 2024-02-07

## 2024-02-07 NOTE — TELEPHONE ENCOUNTER
Lasix was sent in December 2023  His recent K was high, so will hold off refilling Potassium citrate until new level is available. Pls have patient do a potassium check on Thursday Feb 8

## 2024-02-07 NOTE — TELEPHONE ENCOUNTER
Next appt 6/5/24.    Pt states that he did not have insurance and he could not afford the medication at [facility]. He states that he was getting his medications through [facility]. Needs a new script for lasix and potassium, due to he has insurance now.

## 2024-02-08 NOTE — TELEPHONE ENCOUNTER
Spoke to pt, explained to him that his lasix was refilled 12// and should have a month left. He will check his bottle.I explained to him that the doctor wanted him to get his potassium checked before he refills the potassium citrate. Pt understands and will go to [facility].    Order already signed so it can be done today.

## 2024-02-16 ENCOUNTER — TELEPHONE (OUTPATIENT)
Age: 64
End: 2024-02-16

## 2024-02-16 ENCOUNTER — CLINICAL DOCUMENTATION (OUTPATIENT)
Age: 64
End: 2024-02-16

## 2024-02-16 DIAGNOSIS — E11.65 TYPE 2 DIABETES MELLITUS WITH HYPERGLYCEMIA, WITHOUT LONG-TERM CURRENT USE OF INSULIN (HCC): Primary | ICD-10-CM

## 2024-02-16 RX ORDER — SEMAGLUTIDE 0.68 MG/ML
INJECTION, SOLUTION SUBCUTANEOUS
Qty: 3 ML | Refills: 1 | Status: SHIPPED | OUTPATIENT
Start: 2024-02-16

## 2024-02-16 NOTE — TELEPHONE ENCOUNTER
Pt states Trulicity is out everywhere. Pt reports having 3mg Trulicity available at home. Would you like him to use the 3mg or something else? Please advise.

## 2024-02-19 ENCOUNTER — TELEPHONE (OUTPATIENT)
Dept: INTERNAL MEDICINE CLINIC | Age: 64
End: 2024-02-19

## 2024-02-19 ENCOUNTER — OFFICE VISIT (OUTPATIENT)
Dept: INTERNAL MEDICINE CLINIC | Age: 64
End: 2024-02-19
Payer: COMMERCIAL

## 2024-02-19 VITALS
DIASTOLIC BLOOD PRESSURE: 71 MMHG | HEART RATE: 89 BPM | TEMPERATURE: 98.4 F | SYSTOLIC BLOOD PRESSURE: 121 MMHG | HEIGHT: 66 IN | BODY MASS INDEX: 38.31 KG/M2 | WEIGHT: 238.4 LBS

## 2024-02-19 DIAGNOSIS — E11.69 TYPE 2 DIABETES MELLITUS WITH OTHER SPECIFIED COMPLICATION, WITH LONG-TERM CURRENT USE OF INSULIN (HCC): Primary | ICD-10-CM

## 2024-02-19 DIAGNOSIS — Z79.4 TYPE 2 DIABETES MELLITUS WITH OTHER SPECIFIED COMPLICATION, WITH LONG-TERM CURRENT USE OF INSULIN (HCC): Primary | ICD-10-CM

## 2024-02-19 PROCEDURE — NBSRV NON-BILLABLE SERVICE: Performed by: REGISTERED NURSE

## 2024-02-19 PROCEDURE — G0108 DIAB MANAGE TRN  PER INDIV: HCPCS | Performed by: REGISTERED NURSE

## 2024-02-19 NOTE — PROGRESS NOTES
[facility]  [address] [place] [address]  [phone] (phone)  [phone] (fax)    Patient ID: Rich Grayson 1960  Referring Provider: Dr. Gustafson     Patient's name and  were verified.    Subjective:    He presents for a follow-up diabetic visit. He has type 2 diabetes mellitus. He is compliant some of the time.  Assessment:     Lab Results   Component Value Date/Time    LABA1C 11.1 2024 02:57 PM    LABA1C 9.2 2023 12:00 AM     Vitals:    24 1832   BP: 121/71   Site: Right Upper Arm   Position: Sitting   Pulse: 89   Temp: 98.4 °F (36.9 °C)   Weight: 108.1 kg (238 lb 6.4 oz)   Height: 1.676 m (5' 6\")     Wt Readings from Last 3 Encounters:   24 108.1 kg (238 lb 6.4 oz)   24 108.1 kg (238 lb 6.4 oz)   23 108.8 kg (239 lb 12.8 oz)     Ht Readings from Last 3 Encounters:   24 1.676 m (5' 6\")   24 1.676 m (5' 6\")   23 1.676 m (5' 6\")     No results found for: \"LABGLOM\"      Diabetes Pharmacotherapy:  Levemir  96 units morning and night  Humlog 44 units with each meal plus group 5 scale  Glimepiride 4mg 2tabs in AM  Jardiance 25mg daily    Glucose Trends:   Current monitoring regimen: Dexcom CGM - checks 4+ times daily  Home blood sugar trends:    -V. High: 64%, High: 23%, Target: 13%, Low: 0%, V. Low: 0%   -Average Glucose: 282mg/dL; GMI: 10.1%;  %time CGM active 86%              -glucose levels elevated overall, with only downward trend overnight (fasting)  Any episodes of hypoglycemia? no   -Treats with glucose tablets    Lifestyle Factors:   Previous visit with dietician: no  Current diet: B: coffee/ oatmeal/ 1 toast            L: salad/ 2 slice pizza                               Sm salad; [employer] fish sandwich                       D: spagetti/ salad/ pudding                       Snacks: chips                       Beverages: coffee 2 cups; [employer]; water (7 bottles)  Current exercise: 10 minute chair workout daily; active

## 2024-02-19 NOTE — TELEPHONE ENCOUNTER
Diabetes Pharmacotherapy:  Levemir  96 units morning and night  Humlog 44 units with each meal plus group 5 scale  Glimepiride 4mg 2tabs in AM  Jardiance 25mg daily     Glucose Trends:   Current monitoring regimen: Dexcom CGM - checks 4+ times daily  Home blood sugar trends:               -V. High: 64%, High: 23%, Target: 13%, Low: 0%, V. Low: 0%              -Average Glucose: 282mg/dL; GMI: 10.1%;  %time CGM active 86%              -glucose levels elevated overall, with only downward trend overnight (fasting)  Any episodes of hypoglycemia? no    He has been ordered Trulicity 0.75mg weekly with usual monthly titration. He has been unable to obtain 0.75mg, but we did get him established with a 1 month fill at [facility] pharmacy. He wants to go back to the 3mg dose he had been taking several months ago. (He indicated he would restart 3mg on his own if he didn't get the 0.75mg).   He would like to titrate back up quickly. Would 0.75mg for 2 weeks, 1.5 mg (2 injections) for 1 week; then proceed to 3mg weekly be feasable?  Note: Garcia follows with GI at [facility] who has cleared him to resume his Trulicity therapy.    Please advise.

## 2024-02-19 NOTE — PATIENT INSTRUCTIONS
Ask Dr. Gustafson about an easier Glucagon injection when              You need a refill (emergency sugar shot)            --[place] or [place]egalogue are much easier for your         Wife to use  Begin Trulicity 0.75mg weekly for at least 2 weeks.       --will graduate to 3mg weekly  Try to keep your carbohydrates limited           50-60 grams of carbohydrate is ok for meals           30-40 grams could be even better to help control                   Your mealtime blood sugars  Keep your exercise efforts going every day!!  Keep your treatment close by for low blood sugars

## 2024-03-04 LAB
BUN BLDV-MCNC: 26 MG/DL
CALCIUM SERPL-MCNC: 9.6 MG/DL
CHLORIDE BLD-SCNC: 102 MMOL/L
CO2: 28 MMOL/L
CREAT SERPL-MCNC: 1.68 MG/DL
EGFR: 45
GLUCOSE BLD-MCNC: 297 MG/DL
POTASSIUM SERPL-SCNC: 4.4 MMOL/L
SODIUM BLD-SCNC: 140 MMOL/L

## 2024-03-06 ENCOUNTER — TELEPHONE (OUTPATIENT)
Dept: NEPHROLOGY | Age: 64
End: 2024-03-06

## 2024-03-06 NOTE — TELEPHONE ENCOUNTER
----- Message from Watson Bryant MD sent at 3/5/2024  7:23 PM EST -----  I did not order TSH pls forward to ordering provider.

## 2024-03-07 ENCOUNTER — CLINICAL DOCUMENTATION (OUTPATIENT)
Dept: SPIRITUAL SERVICES | Facility: CLINIC | Age: 64
End: 2024-03-07

## 2024-03-07 NOTE — FLOWSHEET NOTE
Pt is a 63y.o. male, visiting spouse whom is a patient in [facility]. Please refer for ACP note for additional context, re: AD's.     03/07/24 1810   Encounter Summary   Encounter Overview/Reason  Advance Care Planning   Service Provided For: Patient and family together   Referral/Consult From: Nursing Supervisor/Manager   Support System Spouse   Last Encounter  03/07/24   Complexity of Encounter Moderate   Begin Time 1610   End Time  1648   Total Time Calculated 38 min   Advance Care Planning   Type   (Received, reviewed and processed for chart inclusion, patient-provided AD's.)   Assessment/Intervention/Outcome   Assessment Coping;Calm;Hopeful;Peaceful   Intervention Prayer (assurance of)/Savage;Explored/Affirmed feelings, thoughts, concerns;Active listening   Outcome Expressed Gratitude;Engaged in conversation;Receptive

## 2024-03-07 NOTE — ACP (ADVANCE CARE PLANNING)
Advance Care Planning   Advance Care Planning Note  Ambulatory Spiritual Care Services    Date:  3/7/2024    Received request from IDT member.    Consultation conversation participants:   Patient who understands ACP conversation  Spouse     Goals of ACP Conversation:  Receive, review and process for chart inclusion, patient-provided Advance Directive documents (HCPOA/LW)    Health Care Decision Makers:      Primary Decision Maker: Chely Grayson DARYL - Spouse - [phone]    Secondary Decision Maker: RenukaShaun - Brother/Sister - [phone]   Summary:  Verified Documents  Verified Healthcare Decision Maker  Updated Healthcare Decision Maker    Advance Care Planning Documents (Patient Wishes)  Currently on file:   Healthcare Power of /Advance Directive Appointment of Health Care Agent  Living Will/Advance Directive    Assessment:   Pt is a 63y.o. male, visiting his wife whom was a patient at [facility] at the time. Following addressing of questions and clarifications needed after documents processed,  prayed for pt and spouse-met with gratitude by both present.    Interventions:  Received, reviewed and processed for chart upload, patient-provided AD's.    Care Preferences Communicated:   No    Outcomes:  Forms processed and uploaded to patient chart successfully.      Electronically signed by Chaplain Ivania on 3/7/2024 at 6:14 PM.

## 2024-03-14 ENCOUNTER — OFFICE VISIT (OUTPATIENT)
Age: 64
End: 2024-03-14
Payer: COMMERCIAL

## 2024-03-14 VITALS
HEIGHT: 66 IN | HEART RATE: 74 BPM | BODY MASS INDEX: 38.44 KG/M2 | DIASTOLIC BLOOD PRESSURE: 70 MMHG | WEIGHT: 239.2 LBS | SYSTOLIC BLOOD PRESSURE: 116 MMHG

## 2024-03-14 DIAGNOSIS — E11.65 TYPE 2 DIABETES MELLITUS WITH HYPERGLYCEMIA, WITHOUT LONG-TERM CURRENT USE OF INSULIN (HCC): ICD-10-CM

## 2024-03-14 PROCEDURE — 3046F HEMOGLOBIN A1C LEVEL >9.0%: CPT | Performed by: INTERNAL MEDICINE

## 2024-03-14 PROCEDURE — 99214 OFFICE O/P EST MOD 30 MIN: CPT | Performed by: INTERNAL MEDICINE

## 2024-03-14 PROCEDURE — 3078F DIAST BP <80 MM HG: CPT | Performed by: INTERNAL MEDICINE

## 2024-03-14 PROCEDURE — 3074F SYST BP LT 130 MM HG: CPT | Performed by: INTERNAL MEDICINE

## 2024-03-14 RX ORDER — ACYCLOVIR 400 MG/1
TABLET ORAL
Qty: 9 EACH | Refills: 1 | Status: SHIPPED | OUTPATIENT
Start: 2024-03-14

## 2024-03-14 NOTE — PROGRESS NOTES
Rich Grayson is a 63 y.o. , male who comes for f/u for Type 2 diabetes mellitus  PCP: Adalgisa Flores MD    HPI   This patient was diagnosed with diabetes mellitus 16 years ago.   Current therapy includes levemir 96/96 with humalog 44/44/44/44 plus scale, amaryl 2 mg bid, Trulicity 3mg weekly, and jardiance 25 mg.  Previous GLP1 was stopped due to pancreas concerns, he was cleared by GI in [facility] for GLP-1 resumption. He has since been rapidly up-titrating Trulicity, 0.75mg weekly x2 weeks, then 1.5mg weekly x1 week, and up to 3mg weekly x1 week. The patient is checking blood sugars 4 times a day using Dexcom CGMS.  Average blood glucose is 268 mg/dL with a glucose management indicator of 9.7%.  The patient was in range 13% of the time with rare lows. Maintains hypoglycemia awareness. Still with paresthesias in setting of neuropathy, blurry vision, polyuria. Has increased physical activity recently after getting foot boot removed. He has appointment with optometry in 1 month. Following with nephrology for diabetic/hypertensive nephropathy. Has pancreatic cyst, [facility] pancreatic doctor recommended removal, surgeon would like to wait, just monitoring for now. His wife has had significant health issues that have distracted him from his routine.         Julita Mulligan [phone]  Past Medical History:   Diagnosis Date    Cancer (HCC)     Chronic kidney disease     Diabetes mellitus (HCC)     Hypertension       Past Surgical History:   Procedure Laterality Date    BACK SURGERY      CARPAL TUNNEL RELEASE      CERVICAL FUSION      EAR SURGERY Left     x4    FINGER TRIGGER RELEASE      PERCUTANEOUS NEPHROLITHOTRIPSY         Family History   Problem Relation Age of Onset    Cancer Mother     Cancer Father     Cancer Maternal Aunt     Cancer Maternal Uncle     Cancer Paternal Uncle     Cancer Maternal Grandmother     Cancer Maternal Grandfather      Social History     Tobacco Use    Smoking status: Never    Smokeless

## 2024-03-19 ENCOUNTER — TELEPHONE (OUTPATIENT)
Age: 64
End: 2024-03-19

## 2024-03-19 NOTE — TELEPHONE ENCOUNTER
Pt called in regarding pen needles prescription recently sent in he states he needs enough pen needles for 6 a day and he needs 3 months worth. Can you please send this in for patient

## 2024-03-21 DIAGNOSIS — E11.65 TYPE 2 DIABETES MELLITUS WITH HYPERGLYCEMIA, WITHOUT LONG-TERM CURRENT USE OF INSULIN (HCC): Primary | ICD-10-CM

## 2024-03-21 RX ORDER — PEN NEEDLE, DIABETIC 31 GX5/16"
NEEDLE, DISPOSABLE MISCELLANEOUS
Qty: 540 EACH | Refills: 1 | Status: SHIPPED | OUTPATIENT
Start: 2024-03-21 | End: 2024-03-27 | Stop reason: SDUPTHER

## 2024-03-22 DIAGNOSIS — E11.65 TYPE 2 DIABETES MELLITUS WITH HYPERGLYCEMIA, WITHOUT LONG-TERM CURRENT USE OF INSULIN (HCC): ICD-10-CM

## 2024-03-22 RX ORDER — PEN NEEDLE, DIABETIC 31 GX5/16"
NEEDLE, DISPOSABLE MISCELLANEOUS
Qty: 540 EACH | Refills: 1 | OUTPATIENT
Start: 2024-03-22

## 2024-03-27 ENCOUNTER — TELEPHONE (OUTPATIENT)
Age: 64
End: 2024-03-27

## 2024-03-27 DIAGNOSIS — E11.65 TYPE 2 DIABETES MELLITUS WITH HYPERGLYCEMIA, WITHOUT LONG-TERM CURRENT USE OF INSULIN (HCC): ICD-10-CM

## 2024-03-27 DIAGNOSIS — E11.65 TYPE 2 DIABETES MELLITUS WITH HYPERGLYCEMIA, WITHOUT LONG-TERM CURRENT USE OF INSULIN (HCC): Primary | ICD-10-CM

## 2024-03-27 RX ORDER — PEN NEEDLE, DIABETIC 31 GX5/16"
NEEDLE, DISPOSABLE MISCELLANEOUS
Qty: 600 EACH | Refills: 1 | Status: SHIPPED | OUTPATIENT
Start: 2024-03-27 | End: 2024-04-01 | Stop reason: ALTCHOICE

## 2024-03-27 RX ORDER — PEN NEEDLE, DIABETIC 31 GX5/16"
NEEDLE, DISPOSABLE MISCELLANEOUS
Qty: 600 EACH | Refills: 1 | Status: SHIPPED | OUTPATIENT
Start: 2024-03-27 | End: 2024-03-27 | Stop reason: SDUPTHER

## 2024-03-27 NOTE — TELEPHONE ENCOUNTER
Patient has called in NOT HAPPY AT ALL. You sent the pen needle prescription on 03/21/2024. On the sig, you put USE 1 SIX TIMES A DAY. Patient needs 6 NEEDLES A DAY. Please fix this and SEND TO [facility] [place]

## 2024-04-01 DIAGNOSIS — E11.65 TYPE 2 DIABETES MELLITUS WITH HYPERGLYCEMIA, WITHOUT LONG-TERM CURRENT USE OF INSULIN (HCC): ICD-10-CM

## 2024-04-02 ENCOUNTER — TELEPHONE (OUTPATIENT)
Age: 64
End: 2024-04-02

## 2024-04-02 DIAGNOSIS — E11.65 TYPE 2 DIABETES MELLITUS WITH HYPERGLYCEMIA, WITHOUT LONG-TERM CURRENT USE OF INSULIN (HCC): ICD-10-CM

## 2024-04-02 RX ORDER — INSULIN GLARGINE 100 [IU]/ML
INJECTION, SOLUTION SUBCUTANEOUS
Qty: 45 ADJUSTABLE DOSE PRE-FILLED PEN SYRINGE | Refills: 3 | Status: SHIPPED | OUTPATIENT
Start: 2024-04-02

## 2024-04-02 NOTE — TELEPHONE ENCOUNTER
Pt states levemir is not covered by insurance, he states he gave you a letter from insurance that listed alternative medications. I do not see this scanned in. Can you send in alternative medication for patient to el

## 2024-05-29 LAB
BUN BLDV-MCNC: 39 MG/DL
CALCIUM SERPL-MCNC: 9.3 MG/DL
CHLORIDE BLD-SCNC: 101 MMOL/L
CO2: 30 MMOL/L
CREAT SERPL-MCNC: 2.45 MG/DL
EGFR: 29
GLUCOSE BLD-MCNC: 248 MG/DL
HCT VFR BLD CALC: 45 % (ref 41–53)
HEMOGLOBIN: 14.7 G/DL (ref 13.5–17.5)
PHOSPHORUS: 3.4 MG/DL
POTASSIUM (K+): 4.8
POTASSIUM SERPL-SCNC: 4.8 MMOL/L
PTH INTACT: 58
SODIUM BLD-SCNC: 138 MMOL/L

## 2024-05-30 ENCOUNTER — TELEPHONE (OUTPATIENT)
Dept: NEPHROLOGY | Age: 64
End: 2024-05-30

## 2024-05-30 DIAGNOSIS — N18.32 CHRONIC KIDNEY DISEASE, STAGE 3B (HCC): Primary | ICD-10-CM

## 2024-05-30 DIAGNOSIS — I12.9 HYPERTENSIVE RENAL DISEASE, STAGE 1 THROUGH STAGE 4 OR UNSPECIFIED CHRONIC KIDNEY DISEASE: ICD-10-CM

## 2024-05-30 NOTE — TELEPHONE ENCOUNTER
Patient would like lab results reviewed d/t having bilateral back pain, right above his waist area.  Patient is wanting to know if any of his labs could explain the pain and if something can be done prior to his next appt on 6/5/24.  Please advise.

## 2024-05-31 NOTE — TELEPHONE ENCOUNTER
Contacted patient, he has not been checking his BP.  Instructed patient to check it twice daily and to call us on Monday with those readings.  He is currently taking the Benazepril 20 mg once daily.  Renal US Complete ordered and scheduled at [facility] on Tues 6/4/2024 @ 2:45pm.  [facility] will contact patient if sooner appt is available.  Patient notified of US date/time, verbalized understanding.

## 2024-06-03 ENCOUNTER — TELEPHONE (OUTPATIENT)
Dept: NEPHROLOGY | Age: 64
End: 2024-06-03

## 2024-06-03 DIAGNOSIS — I12.9 HYPERTENSIVE RENAL DISEASE, STAGE 1 THROUGH STAGE 4 OR UNSPECIFIED CHRONIC KIDNEY DISEASE: ICD-10-CM

## 2024-06-03 DIAGNOSIS — N18.32 CHRONIC KIDNEY DISEASE, STAGE 3B (HCC): ICD-10-CM

## 2024-06-03 DIAGNOSIS — E11.65 TYPE 2 DIABETES MELLITUS WITH HYPERGLYCEMIA, WITHOUT LONG-TERM CURRENT USE OF INSULIN (HCC): ICD-10-CM

## 2024-06-03 DIAGNOSIS — N18.32 CHRONIC KIDNEY DISEASE, STAGE 3B (HCC): Primary | ICD-10-CM

## 2024-06-03 DIAGNOSIS — E87.5 HYPERKALEMIA: ICD-10-CM

## 2024-06-03 DIAGNOSIS — F19.90 EXCESSIVE USE OF NONSTEROIDAL ANTI-INFLAMMATORY DRUG (NSAID): ICD-10-CM

## 2024-06-03 NOTE — TELEPHONE ENCOUNTER
----- Message from Watson Bryant MD sent at 6/3/2024 11:09 AM EDT -----  Repeat BMP today or tomorrow.

## 2024-06-04 DIAGNOSIS — N18.32 CHRONIC KIDNEY DISEASE, STAGE 3B (HCC): ICD-10-CM

## 2024-06-04 DIAGNOSIS — F19.90 EXCESSIVE USE OF NONSTEROIDAL ANTI-INFLAMMATORY DRUG (NSAID): ICD-10-CM

## 2024-06-04 DIAGNOSIS — I12.9 HYPERTENSIVE RENAL DISEASE, STAGE 1 THROUGH STAGE 4 OR UNSPECIFIED CHRONIC KIDNEY DISEASE: ICD-10-CM

## 2024-06-05 ENCOUNTER — OFFICE VISIT (OUTPATIENT)
Dept: NEPHROLOGY | Age: 64
End: 2024-06-05
Payer: COMMERCIAL

## 2024-06-05 VITALS
OXYGEN SATURATION: 96 % | WEIGHT: 240 LBS | DIASTOLIC BLOOD PRESSURE: 74 MMHG | HEIGHT: 67 IN | BODY MASS INDEX: 37.67 KG/M2 | HEART RATE: 78 BPM | SYSTOLIC BLOOD PRESSURE: 109 MMHG

## 2024-06-05 DIAGNOSIS — N20.0 KIDNEY STONE: ICD-10-CM

## 2024-06-05 DIAGNOSIS — I12.9 HYPERTENSIVE RENAL DISEASE, STAGE 1 THROUGH STAGE 4 OR UNSPECIFIED CHRONIC KIDNEY DISEASE: ICD-10-CM

## 2024-06-05 DIAGNOSIS — N18.32 CHRONIC KIDNEY DISEASE, STAGE 3B (HCC): Primary | ICD-10-CM

## 2024-06-05 DIAGNOSIS — N28.1 RENAL CYST: ICD-10-CM

## 2024-06-05 PROCEDURE — 99213 OFFICE O/P EST LOW 20 MIN: CPT | Performed by: INTERNAL MEDICINE

## 2024-06-05 PROCEDURE — 3078F DIAST BP <80 MM HG: CPT | Performed by: INTERNAL MEDICINE

## 2024-06-05 PROCEDURE — 3074F SYST BP LT 130 MM HG: CPT | Performed by: INTERNAL MEDICINE

## 2024-06-05 RX ORDER — POTASSIUM CITRATE 10 MEQ/1
20 TABLET, EXTENDED RELEASE ORAL DAILY
Qty: 120 TABLET | Refills: 5
Start: 2024-06-05

## 2024-06-05 NOTE — PROGRESS NOTES
[facility] [place] [facility]  [facility]  [address]  [facility] [address]  Dept: [phone]  Loc: [phone]  Office Progress Note  6/5/2024 1:43 PM      Pt Name:    Rich Grayson  YOB: 1960  Primary Care Physician:  Adalgisa Flores MD     Chief Complaint:   Chief Complaint   Patient presents with    Follow-up     CKD , HTN , diuretics mgmt        Background Information/Interval History:   The patient is a 63 y.o. with hx HTN, DM who is here for follow-up evaluation of renal dysfunction. Patient reports hx kidney stones s/p lithotripsy. He has had ureteral stents and saw urology in the past. Has DM for 16+ years. He has hx pancreas cyst- [facility].  Was drinking some beers in the past but no longer. He has had radiation therapy for ?cancer (oral) in the past- but unable to recall details at this time. He takes potassium citrate 10 meq 4 tabs BID. He says he takes potassium citrate --has been taking it since 1990s ever since he had kidney stones surgery. Also taken lotensin, norvasc. Also took mobic daily.  Has lot of arthritis in hands.   No longer taking mobic.     He is here for follow-up. Here for six months. No leg swelling. No urinary complaints. Sugars are still upto 300+ at times. He is seeing endo.      Past History:  Past Medical History:   Diagnosis Date    Cancer (HCC)     Chronic kidney disease     Diabetes mellitus (HCC)     Hypertension      Past Surgical History:   Procedure Laterality Date    BACK SURGERY      CARPAL TUNNEL RELEASE      CERVICAL FUSION      EAR SURGERY Left     x4    FINGER TRIGGER RELEASE      PERCUTANEOUS NEPHROLITHOTRIPSY          VITALS:  /74 (Site: Right Upper Arm, Position: Sitting, Cuff Size: Large Adult)   Pulse 78   Ht 1.702 m (5' 7\")   Wt 108.9 kg (240 lb)   SpO2 96%   BMI 37.59 kg/m²   Wt Readings from Last 3 Encounters:   06/05/24 108.9 kg (240 lb)   03/14/24 108.5 kg (239 lb 3.2 oz)

## 2024-06-07 NOTE — TELEPHONE ENCOUNTER
Submitted prior authorization for Humalog, Cover My Meds response states \"Available without authorization\", but pharmacy is stating that it is not approved due to \"max of 260 units\" in the sig. Please advise.

## 2024-06-10 DIAGNOSIS — E11.65 TYPE 2 DIABETES MELLITUS WITH HYPERGLYCEMIA, WITHOUT LONG-TERM CURRENT USE OF INSULIN (HCC): ICD-10-CM

## 2024-06-10 RX ORDER — DULAGLUTIDE 3 MG/.5ML
INJECTION, SOLUTION SUBCUTANEOUS
Qty: 2 ML | Refills: 3 | Status: SHIPPED | OUTPATIENT
Start: 2024-06-10

## 2024-06-25 ENCOUNTER — TELEPHONE (OUTPATIENT)
Age: 64
End: 2024-06-25

## 2024-06-25 ENCOUNTER — CLINICAL DOCUMENTATION (OUTPATIENT)
Age: 64
End: 2024-06-25

## 2024-06-25 DIAGNOSIS — E11.65 TYPE 2 DIABETES MELLITUS WITH HYPERGLYCEMIA, WITHOUT LONG-TERM CURRENT USE OF INSULIN (HCC): Primary | ICD-10-CM

## 2024-06-25 RX ORDER — DULAGLUTIDE 1.5 MG/.5ML
1.5 INJECTION, SOLUTION SUBCUTANEOUS WEEKLY
Qty: 4 ADJUSTABLE DOSE PRE-FILLED PEN SYRINGE | Refills: 3 | Status: SHIPPED | OUTPATIENT
Start: 2024-06-25

## 2024-06-25 NOTE — TELEPHONE ENCOUNTER
Pt called in stating that Trulicity is nto available at [facility].  He states no pharmacy's have the 3mg. He states that [facility] [place] has the 4mg and [facility] in [place] has 1.5mg. Please advise what you would like patient to do and please send in rx if needed.

## 2024-07-01 DIAGNOSIS — E11.65 TYPE 2 DIABETES MELLITUS WITH HYPERGLYCEMIA, WITHOUT LONG-TERM CURRENT USE OF INSULIN (HCC): ICD-10-CM

## 2024-07-01 RX ORDER — DULAGLUTIDE 1.5 MG/.5ML
1.5 INJECTION, SOLUTION SUBCUTANEOUS WEEKLY
Qty: 4 ADJUSTABLE DOSE PRE-FILLED PEN SYRINGE | Refills: 3 | Status: SHIPPED | OUTPATIENT
Start: 2024-07-01

## 2024-07-15 DIAGNOSIS — E11.65 TYPE 2 DIABETES MELLITUS WITH HYPERGLYCEMIA, WITHOUT LONG-TERM CURRENT USE OF INSULIN (HCC): ICD-10-CM

## 2024-07-18 RX ORDER — GLIMEPIRIDE 4 MG/1
4 TABLET ORAL 2 TIMES DAILY
Qty: 180 TABLET | Refills: 1 | Status: SHIPPED | OUTPATIENT
Start: 2024-07-18

## 2024-07-18 RX ORDER — INSULIN GLARGINE 100 [IU]/ML
INJECTION, SOLUTION SUBCUTANEOUS
Qty: 45 ADJUSTABLE DOSE PRE-FILLED PEN SYRINGE | Refills: 3 | Status: SHIPPED | OUTPATIENT
Start: 2024-07-18

## 2024-08-15 RX ORDER — POTASSIUM CITRATE 10 MEQ/1
10 TABLET, EXTENDED RELEASE ORAL 2 TIMES DAILY
Qty: 180 TABLET | Refills: 3 | Status: SHIPPED | OUTPATIENT
Start: 2024-08-15

## 2024-08-28 DIAGNOSIS — E11.65 TYPE 2 DIABETES MELLITUS WITH HYPERGLYCEMIA, WITHOUT LONG-TERM CURRENT USE OF INSULIN (HCC): ICD-10-CM

## 2024-09-04 ENCOUNTER — OFFICE VISIT (OUTPATIENT)
Dept: NEPHROLOGY | Age: 64
End: 2024-09-04

## 2024-09-04 VITALS
DIASTOLIC BLOOD PRESSURE: 79 MMHG | HEIGHT: 67 IN | WEIGHT: 239 LBS | BODY MASS INDEX: 37.51 KG/M2 | SYSTOLIC BLOOD PRESSURE: 122 MMHG | HEART RATE: 81 BPM | OXYGEN SATURATION: 99 %

## 2024-09-04 DIAGNOSIS — N18.4 CKD (CHRONIC KIDNEY DISEASE), STAGE IV (HCC): Primary | ICD-10-CM

## 2024-09-04 DIAGNOSIS — I12.9 HYPERTENSIVE RENAL DISEASE, STAGE 1 THROUGH STAGE 4 OR UNSPECIFIED CHRONIC KIDNEY DISEASE: ICD-10-CM

## 2024-09-04 DIAGNOSIS — E11.21 DIABETIC NEPHROPATHY ASSOCIATED WITH TYPE 2 DIABETES MELLITUS (HCC): ICD-10-CM

## 2024-09-04 RX ORDER — FUROSEMIDE 40 MG
40 TABLET ORAL DAILY
Qty: 90 TABLET | Refills: 3 | Status: SHIPPED | OUTPATIENT
Start: 2024-09-04

## 2024-09-04 RX ORDER — GABAPENTIN 300 MG/1
600 CAPSULE ORAL 2 TIMES DAILY
COMMUNITY
Start: 2024-09-04

## 2024-09-04 NOTE — PROGRESS NOTES
on jardiance. We talked about kerendia. He wants to hold off kerendia because \"my insurance is not going to approve it, I already checked it.\" He says he is getting on medicare in Nov 2024. Discussed renal prognosis. Increase water intake. Reduce salt intake. Reduce red meat intake. Discussed in detail.   2. HTN: on norvasc and lotensin. Advised low salt diet.   3. IDDM: on jardiance, amaryl, insulin  4 Hx kidney stones: on K citrate 10 meq BID  5. Pancreatic cyst  6. NSAID intake: he has stopped mobic  7. Mild peripheral edema:on lasix. Refill given  8. Renal cyst: I reviewed US June 2024.       US June 2024 reviewed.     Orders Placed This Encounter   Procedures    Basic Metabolic Panel    PTH, Intact    Hemoglobin and Hematocrit    Phosphorus    Protein / creatinine ratio, urine     Return in about 4 months (around 1/4/2025).          Watson Bryant MD  [facility]

## 2024-09-16 DIAGNOSIS — E11.65 TYPE 2 DIABETES MELLITUS WITH HYPERGLYCEMIA, WITHOUT LONG-TERM CURRENT USE OF INSULIN (HCC): ICD-10-CM

## 2024-09-16 RX ORDER — INSULIN LISPRO 200 [IU]/ML
INJECTION, SOLUTION SUBCUTANEOUS
Qty: 45 ADJUSTABLE DOSE PRE-FILLED PEN SYRINGE | Refills: 3 | Status: SHIPPED | OUTPATIENT
Start: 2024-09-16

## 2024-09-18 ENCOUNTER — OFFICE VISIT (OUTPATIENT)
Age: 64
End: 2024-09-18
Payer: COMMERCIAL

## 2024-09-18 VITALS
HEART RATE: 82 BPM | BODY MASS INDEX: 37.17 KG/M2 | RESPIRATION RATE: 16 BRPM | DIASTOLIC BLOOD PRESSURE: 88 MMHG | WEIGHT: 236.8 LBS | HEIGHT: 67 IN | SYSTOLIC BLOOD PRESSURE: 124 MMHG

## 2024-09-18 DIAGNOSIS — E11.65 TYPE 2 DIABETES MELLITUS WITH HYPERGLYCEMIA, WITHOUT LONG-TERM CURRENT USE OF INSULIN (HCC): Primary | ICD-10-CM

## 2024-09-18 DIAGNOSIS — E78.2 MIXED HYPERLIPIDEMIA: ICD-10-CM

## 2024-09-18 DIAGNOSIS — E03.9 ACQUIRED HYPOTHYROIDISM: ICD-10-CM

## 2024-09-18 PROCEDURE — 3046F HEMOGLOBIN A1C LEVEL >9.0%: CPT | Performed by: INTERNAL MEDICINE

## 2024-09-18 PROCEDURE — 99215 OFFICE O/P EST HI 40 MIN: CPT | Performed by: INTERNAL MEDICINE

## 2024-09-18 PROCEDURE — 95251 CONT GLUC MNTR ANALYSIS I&R: CPT | Performed by: INTERNAL MEDICINE

## 2024-09-18 PROCEDURE — 3079F DIAST BP 80-89 MM HG: CPT | Performed by: INTERNAL MEDICINE

## 2024-09-18 PROCEDURE — 3074F SYST BP LT 130 MM HG: CPT | Performed by: INTERNAL MEDICINE

## 2024-09-18 RX ORDER — GLIMEPIRIDE 4 MG/1
4 TABLET ORAL 2 TIMES DAILY
Qty: 180 TABLET | Refills: 1 | Status: SHIPPED | OUTPATIENT
Start: 2024-09-18

## 2024-09-18 RX ORDER — ACYCLOVIR 400 MG/1
TABLET ORAL
Qty: 9 EACH | Refills: 1 | Status: SHIPPED | OUTPATIENT
Start: 2024-09-18

## 2024-09-19 ENCOUNTER — TELEPHONE (OUTPATIENT)
Age: 64
End: 2024-09-19

## 2024-09-19 ENCOUNTER — TELEPHONE (OUTPATIENT)
Dept: NEPHROLOGY | Age: 64
End: 2024-09-19

## 2024-09-19 DIAGNOSIS — I12.9 HYPERTENSIVE RENAL DISEASE, STAGE 1 THROUGH STAGE 4 OR UNSPECIFIED CHRONIC KIDNEY DISEASE: ICD-10-CM

## 2024-09-19 DIAGNOSIS — N18.4 CKD (CHRONIC KIDNEY DISEASE), STAGE IV (HCC): Primary | ICD-10-CM

## 2024-09-19 DIAGNOSIS — E11.21 DIABETIC NEPHROPATHY ASSOCIATED WITH TYPE 2 DIABETES MELLITUS (HCC): ICD-10-CM

## 2024-09-26 LAB
CHOLESTEROL, TOTAL: 200 MG/DL
CHOLESTEROL/HDL RATIO: 5
HDLC SERPL-MCNC: 40 MG/DL (ref 35–70)
LDL CHOLESTEROL: 117
NONHDLC SERPL-MCNC: ABNORMAL MG/DL
T4 FREE: 0.78
TRIGL SERPL-MCNC: 216 MG/DL
TSH SERPL DL<=0.05 MIU/L-ACNC: 1.6 UIU/ML
VLDLC SERPL CALC-MCNC: 43 MG/DL

## 2024-10-13 ENCOUNTER — CLINICAL DOCUMENTATION (OUTPATIENT)
Age: 64
End: 2024-10-13

## 2024-10-18 LAB
BUN BLDV-MCNC: 46 MG/DL
CALCIUM SERPL-MCNC: 9.6 MG/DL
CHLORIDE BLD-SCNC: 106 MMOL/L
CO2: 33 MMOL/L
CREAT SERPL-MCNC: 2.97 MG/DL
EGFR: 23
GLUCOSE BLD-MCNC: 167 MG/DL
POTASSIUM SERPL-SCNC: 4.9 MMOL/L
SODIUM BLD-SCNC: 141 MMOL/L

## 2024-10-21 ENCOUNTER — TELEPHONE (OUTPATIENT)
Dept: NEPHROLOGY | Age: 64
End: 2024-10-21

## 2024-10-21 NOTE — TELEPHONE ENCOUNTER
----- Message from Dr. Watson Bryant MD sent at 10/21/2024 10:44 AM EDT -----  Creat was 2.98 in Aug 2024  Creat is 2.97 now  Stable  Inform pt

## 2024-10-22 DIAGNOSIS — E11.65 TYPE 2 DIABETES MELLITUS WITH HYPERGLYCEMIA, WITHOUT LONG-TERM CURRENT USE OF INSULIN (HCC): ICD-10-CM

## 2024-12-18 ENCOUNTER — CLINICAL DOCUMENTATION (OUTPATIENT)
Age: 64
End: 2024-12-18

## 2024-12-18 DIAGNOSIS — E11.65 TYPE 2 DIABETES MELLITUS WITH HYPERGLYCEMIA, WITHOUT LONG-TERM CURRENT USE OF INSULIN (HCC): ICD-10-CM

## 2024-12-19 NOTE — PROGRESS NOTES
I got communication from patient's insurance [[employer]] that they will not cover the current pen needles [BD ultrafine original pen needles 12.7 mm x 29 gauge].  Patient tells me that it will be covered under Medicare so he will check with Medicare.  I have sent a new prescription to his to his pharmacy and he will confirm tomorrow if it is covered or not.

## 2024-12-21 DIAGNOSIS — N18.4 CKD (CHRONIC KIDNEY DISEASE), STAGE IV (HCC): ICD-10-CM

## 2024-12-21 DIAGNOSIS — E11.21 DIABETIC NEPHROPATHY ASSOCIATED WITH TYPE 2 DIABETES MELLITUS (HCC): ICD-10-CM

## 2024-12-21 DIAGNOSIS — I12.9 HYPERTENSIVE RENAL DISEASE, STAGE 1 THROUGH STAGE 4 OR UNSPECIFIED CHRONIC KIDNEY DISEASE: ICD-10-CM

## 2024-12-23 RX ORDER — EMPAGLIFLOZIN 10 MG/1
10 TABLET, FILM COATED ORAL DAILY
Qty: 90 TABLET | Refills: 3 | Status: SHIPPED | OUTPATIENT
Start: 2024-12-23

## 2024-12-23 NOTE — TELEPHONE ENCOUNTER
Patient called back and stated he just had a BMP done at [facility] about an hour ago.  Trying to bring those results in from [facility].  If patient needs another lab done after this one, he would like it sent to [facility].

## 2024-12-23 NOTE — TELEPHONE ENCOUNTER
Left message informing pt to get a bmp done. Asked for a call back to let me know where to send labs.

## 2024-12-31 ENCOUNTER — OFFICE VISIT (OUTPATIENT)
Age: 64
End: 2024-12-31
Payer: MEDICARE

## 2024-12-31 VITALS
WEIGHT: 241.5 LBS | SYSTOLIC BLOOD PRESSURE: 108 MMHG | HEIGHT: 67 IN | HEART RATE: 87 BPM | DIASTOLIC BLOOD PRESSURE: 70 MMHG | BODY MASS INDEX: 37.9 KG/M2

## 2024-12-31 DIAGNOSIS — E11.65 TYPE 2 DIABETES MELLITUS WITH HYPERGLYCEMIA, WITHOUT LONG-TERM CURRENT USE OF INSULIN (HCC): ICD-10-CM

## 2024-12-31 DIAGNOSIS — E03.9 ACQUIRED HYPOTHYROIDISM: ICD-10-CM

## 2024-12-31 DIAGNOSIS — E78.2 MIXED HYPERLIPIDEMIA: ICD-10-CM

## 2024-12-31 DIAGNOSIS — E11.65 TYPE 2 DIABETES MELLITUS WITH HYPERGLYCEMIA, WITHOUT LONG-TERM CURRENT USE OF INSULIN (HCC): Primary | ICD-10-CM

## 2024-12-31 PROCEDURE — 99214 OFFICE O/P EST MOD 30 MIN: CPT | Performed by: INTERNAL MEDICINE

## 2024-12-31 PROCEDURE — G8484 FLU IMMUNIZE NO ADMIN: HCPCS | Performed by: INTERNAL MEDICINE

## 2024-12-31 PROCEDURE — 3078F DIAST BP <80 MM HG: CPT | Performed by: INTERNAL MEDICINE

## 2024-12-31 PROCEDURE — G8427 DOCREV CUR MEDS BY ELIG CLIN: HCPCS | Performed by: INTERNAL MEDICINE

## 2024-12-31 PROCEDURE — 3046F HEMOGLOBIN A1C LEVEL >9.0%: CPT | Performed by: INTERNAL MEDICINE

## 2024-12-31 PROCEDURE — 3074F SYST BP LT 130 MM HG: CPT | Performed by: INTERNAL MEDICINE

## 2024-12-31 PROCEDURE — 3017F COLORECTAL CA SCREEN DOC REV: CPT | Performed by: INTERNAL MEDICINE

## 2024-12-31 PROCEDURE — G8417 CALC BMI ABV UP PARAM F/U: HCPCS | Performed by: INTERNAL MEDICINE

## 2024-12-31 PROCEDURE — 1036F TOBACCO NON-USER: CPT | Performed by: INTERNAL MEDICINE

## 2024-12-31 PROCEDURE — 1123F ACP DISCUSS/DSCN MKR DOCD: CPT | Performed by: INTERNAL MEDICINE

## 2024-12-31 PROCEDURE — 2022F DILAT RTA XM EVC RTNOPTHY: CPT | Performed by: INTERNAL MEDICINE

## 2024-12-31 RX ORDER — GABAPENTIN 600 MG/1
600 TABLET ORAL 3 TIMES DAILY
COMMUNITY
Start: 2024-12-08

## 2024-12-31 RX ORDER — BENAZEPRIL HYDROCHLORIDE 10 MG/1
10 TABLET ORAL DAILY
COMMUNITY
Start: 2024-11-05

## 2024-12-31 RX ORDER — ATORVASTATIN CALCIUM 40 MG/1
40 TABLET, FILM COATED ORAL DAILY
COMMUNITY
Start: 2024-09-27

## 2024-12-31 RX ORDER — LEVOTHYROXINE SODIUM 75 UG/1
75 TABLET ORAL DAILY
Qty: 90 TABLET | Refills: 1 | Status: SHIPPED | OUTPATIENT
Start: 2024-12-31

## 2024-12-31 RX ORDER — INSULIN LISPRO 200 [IU]/ML
INJECTION, SOLUTION SUBCUTANEOUS
Qty: 45 ADJUSTABLE DOSE PRE-FILLED PEN SYRINGE | Refills: 3 | Status: SHIPPED | OUTPATIENT
Start: 2024-12-31

## 2024-12-31 RX ORDER — PEN NEEDLE, DIABETIC 29 G X1/2"
NEEDLE, DISPOSABLE MISCELLANEOUS
Qty: 600 EACH | Refills: 1 | Status: SHIPPED | OUTPATIENT
Start: 2024-12-31 | End: 2025-01-03 | Stop reason: ALTCHOICE

## 2024-12-31 RX ORDER — INSULIN GLARGINE 100 [IU]/ML
INJECTION, SOLUTION SUBCUTANEOUS
Qty: 45 ADJUSTABLE DOSE PRE-FILLED PEN SYRINGE | Refills: 3 | Status: SHIPPED | OUTPATIENT
Start: 2024-12-31

## 2024-12-31 RX ORDER — PEN NEEDLE, DIABETIC 29 G X1/2"
1 NEEDLE, DISPOSABLE MISCELLANEOUS
COMMUNITY
Start: 2024-12-05 | End: 2024-12-31 | Stop reason: SDUPTHER

## 2024-12-31 RX ORDER — FENOFIBRATE 48 MG/1
48 TABLET, COATED ORAL DAILY
COMMUNITY

## 2024-12-31 RX ORDER — ACYCLOVIR 400 MG/1
TABLET ORAL
Qty: 9 EACH | Refills: 1 | Status: SHIPPED | OUTPATIENT
Start: 2024-12-31

## 2024-12-31 NOTE — PROGRESS NOTES
DVT  Pulses:2+ and symmetric  Skin:warm and dry, no hyperpigmentation, vitiligo, or suspicious lesions  Neuro:normal without focal findings, mental status, speech normal, alert and oriented x3, LIZY, cranial nerves 2-12 intact, muscle tone and strength normal and symmetric.  Lymph nodes: There is no cervical, supraclavicular or submental adenopathy.  Musculoskeletal:  No joint swelling or deformity.  Psychiatry: Alert and oriented ×3.  Making good eye contact.  Affect is normal.      Assessment/Plan:  Assessment & Plan  1. Diabetes mellitus  - Blood glucose levels appear elevated based on symptoms (frequent urination, blurry vision, chronic tingling, and burning sensations)  - No  blood sugar readings available  - Maintain physical activity and mobility  - Continue current regimen: Lantus 96 units in the morning and 96 units at night, Humalog 44 units at breakfast, lunch, dinner, and bedtime, plus a sliding scale.  The idea of transitioning him to concentrated insulin is particularly appealing.  - Confirm current Trulicity dosage and communicate back    - If current dosage is 3 mg, increase to 4.5 mg    - If current dosage is 1.5 mg, increase to 3 mg  - Conduct A1c test to assess current status  - Perform C-peptide test to quantify insulin production   - Provide blood sugar readings within 2 weeks    2. Hyperlipidemia  - Recent increase in Lipitor dosage from 20 mg to 40 mg  - Provide next set of lab results for review    3. Hypothyroidism  - No changes in condition  - Continue current regimen of levothyroxine 75 mcg  - Recheck thyroid levels at the next visit      Orders Placed This Encounter   Procedures    Hemoglobin A1C     Standing Status:   Future     Standing Expiration Date:   3/31/2025    C-Peptide     Standing Status:   Future     Standing Expiration Date:   3/31/2025     TIME: it took me a total of 33 minutes to complete:  [x] Pre-visit chart review  [x] Face to face encounter (including discussion of

## 2025-01-02 ENCOUNTER — TELEPHONE (OUTPATIENT)
Age: 65
End: 2025-01-02

## 2025-01-02 LAB
C-PEPTIDE: 2.84
ESTIMATED AVERAGE GLUCOSE: 225
HBA1C MFR BLD: 9.5 %

## 2025-01-03 ENCOUNTER — TELEPHONE (OUTPATIENT)
Dept: NEPHROLOGY | Age: 65
End: 2025-01-03

## 2025-01-03 ENCOUNTER — CLINICAL DOCUMENTATION (OUTPATIENT)
Age: 65
End: 2025-01-03

## 2025-01-03 DIAGNOSIS — E11.65 TYPE 2 DIABETES MELLITUS WITH HYPERGLYCEMIA, WITHOUT LONG-TERM CURRENT USE OF INSULIN (HCC): Primary | ICD-10-CM

## 2025-01-03 RX ORDER — PEN NEEDLE, DIABETIC 29G X 3/8"
NEEDLE, DISPOSABLE MISCELLANEOUS
Qty: 200 EACH | Refills: 5 | Status: SHIPPED | OUTPATIENT
Start: 2025-01-03 | End: 2025-01-06

## 2025-01-03 NOTE — PROGRESS NOTES
Patient is currently on Trulicity 1.5 mg weekly.  I have advised him to go to 3 mg and call me back after he has been on it for 1 month so we can go to the 4.5 mg.   no blurred vision L/no blurred vision R/no loss of vision R/no loss of vision L

## 2025-01-03 NOTE — TELEPHONE ENCOUNTER
Pt stopped in to fill out Jardiance assistance program forms. He stated the doctor mentioned maybe switching him to Farxiga. I informed him that there is an assistance program for Farxiga also. He wanted to know if he could switch medications. JarCapton will cost him $720.00 every 3 months    His next appointment is 1/7/25..

## 2025-01-06 ENCOUNTER — CLINICAL DOCUMENTATION (OUTPATIENT)
Age: 65
End: 2025-01-06

## 2025-01-06 DIAGNOSIS — E11.65 TYPE 2 DIABETES MELLITUS WITH HYPERGLYCEMIA, WITHOUT LONG-TERM CURRENT USE OF INSULIN (HCC): Primary | ICD-10-CM

## 2025-01-06 RX ORDER — PEN NEEDLE, DIABETIC 29G X 3/8"
NEEDLE, DISPOSABLE MISCELLANEOUS
Refills: 0 | OUTPATIENT
Start: 2025-01-06

## 2025-01-06 NOTE — TELEPHONE ENCOUNTER
Patient is currently on Trulicity 1.5 mg weekly.  I have advised him to go to 3 mg and call me back after he has been on it for 1 month so we can go to the 4.5 mg.

## 2025-01-06 NOTE — TELEPHONE ENCOUNTER
Patient informed. He states the wrong needles were ordered. He needs BD ULTRA FINE PEN NEEDLES 29G 12.7 MM pharmacy is [facility] in [place]

## 2025-01-07 ENCOUNTER — OFFICE VISIT (OUTPATIENT)
Dept: NEPHROLOGY | Age: 65
End: 2025-01-07

## 2025-01-07 VITALS
HEIGHT: 66 IN | OXYGEN SATURATION: 99 % | DIASTOLIC BLOOD PRESSURE: 63 MMHG | HEART RATE: 84 BPM | BODY MASS INDEX: 38.57 KG/M2 | WEIGHT: 240 LBS | SYSTOLIC BLOOD PRESSURE: 101 MMHG

## 2025-01-07 DIAGNOSIS — N28.1 RENAL CYST: ICD-10-CM

## 2025-01-07 DIAGNOSIS — I12.9 HYPERTENSIVE RENAL DISEASE, STAGE 1 THROUGH STAGE 4 OR UNSPECIFIED CHRONIC KIDNEY DISEASE: ICD-10-CM

## 2025-01-07 DIAGNOSIS — N18.4 CKD (CHRONIC KIDNEY DISEASE), STAGE IV (HCC): Primary | ICD-10-CM

## 2025-01-07 NOTE — PROGRESS NOTES
Wants to discus switching to Farxiga.  
assistance plan but need to await EGD report.   2. HTN: on norvasc and lotensin. Advised low salt diet.   3. IDDM  4 Hx kidney stones: on K citrate 10 meq BID  5. Pancreatic cyst: EUS scheduled for Monday  6. NSAID intake: he has stopped mobic  7. Mild peripheral edema:on lasix.  8. Renal cyst: US June 2024.     US June 2024 reviewed.     Orders Placed This Encounter   Procedures    Basic Metabolic Panel    Hemoglobin and Hematocrit    PTH, Intact    Phosphorus    Protein / creatinine ratio, urine     Return in about 4 months (around 5/7/2025).    Watson Bryant MD  [facility]

## 2025-01-14 ENCOUNTER — TELEPHONE (OUTPATIENT)
Dept: NEPHROLOGY | Age: 65
End: 2025-01-14

## 2025-01-14 NOTE — TELEPHONE ENCOUNTER
Rich states he had the endoscopic procedure done yesterday just wanted dr abebe aware since its pending his farxiga - [phone]

## 2025-01-15 NOTE — TELEPHONE ENCOUNTER
Can you check with his GI doctor if they are okay with jardiance/Farxiga from their standpoint? He reported to us last week that jardiance was put on hold by his GI doctor.

## 2025-01-15 NOTE — TELEPHONE ENCOUNTER
Spoke to Ladan at [facility], she will get a message to the doctor to see if it is okay for pt to take jardiance/Farxiga from their standpoint. She will get a message to Dr. Mulligan. She stated that pt has not seen Dr. Mulligan for 2 years, so he might need an appointment.

## 2025-01-17 NOTE — TELEPHONE ENCOUNTER
Spoke to pt to let him know that I am waiting on approval for Farxiga or Jardiance. Pt states to to call Dr. Brady at [phone].      Spoke to Ismael at [facility], he will send Dr. Brady's team a message to see if pt can take Farxiga or Jardiance.

## 2025-01-21 NOTE — TELEPHONE ENCOUNTER
Followed up with [facility] to see if pt is cleared to take Farxiga or Jardiance. Spoke to Ismael he stated that he will send a message again and he apologized.

## 2025-01-21 NOTE — TELEPHONE ENCOUNTER
Can patient see me in the office and I can discuss with him to clarify and I will prescribe.  Does he need financial assistance paperwork filled out?

## 2025-01-21 NOTE — TELEPHONE ENCOUNTER
Niru from Dr. Brady's office called to inform us that per Evelia Nelson, DANIELITO,   \"I'm not going to give \"clearance\" for that. It is up to the prescriber to weigh   the risks and benefits, along with the patient and made the decision whether or not to prescribe.\"

## 2025-01-21 NOTE — TELEPHONE ENCOUNTER
The reason we are asking is because patient informed us that GI had asked him to stop his Jardiance before his EGD

## 2025-01-22 ENCOUNTER — OFFICE VISIT (OUTPATIENT)
Dept: NEPHROLOGY | Age: 65
End: 2025-01-22

## 2025-01-22 VITALS
HEART RATE: 68 BPM | BODY MASS INDEX: 38.25 KG/M2 | DIASTOLIC BLOOD PRESSURE: 73 MMHG | WEIGHT: 238 LBS | HEIGHT: 66 IN | OXYGEN SATURATION: 100 % | SYSTOLIC BLOOD PRESSURE: 123 MMHG

## 2025-01-22 DIAGNOSIS — E11.21 DIABETIC NEPHROPATHY ASSOCIATED WITH TYPE 2 DIABETES MELLITUS (HCC): ICD-10-CM

## 2025-01-22 DIAGNOSIS — I12.9 HYPERTENSIVE RENAL DISEASE, STAGE 1 THROUGH STAGE 4 OR UNSPECIFIED CHRONIC KIDNEY DISEASE: ICD-10-CM

## 2025-01-22 DIAGNOSIS — N18.4 CKD (CHRONIC KIDNEY DISEASE), STAGE IV (HCC): Primary | ICD-10-CM

## 2025-01-22 RX ORDER — DAPAGLIFLOZIN 10 MG/1
10 TABLET, FILM COATED ORAL EVERY MORNING
COMMUNITY
Start: 2025-01-22 | End: 2025-01-22 | Stop reason: SDUPTHER

## 2025-01-22 RX ORDER — DAPAGLIFLOZIN 10 MG/1
10 TABLET, FILM COATED ORAL EVERY MORNING
Qty: 14 TABLET | Refills: 0 | Status: SHIPPED | COMMUNITY
Start: 2025-01-22

## 2025-01-22 NOTE — PROGRESS NOTES
[facility] [place] [facility]  [facility]  [address]  [facility] [address]  Dept: [phone]  Loc: [phone]  Office Progress Note  1/22/2025 12:45 PM      Pt Name:    Rich Grayson  YOB: 1960  Primary Care Physician:  Adalgisa Flores MD     Chief Complaint:   Chief Complaint   Patient presents with    Follow-up     CKD , HTN , diuretics mgmt        Background Information/Interval History:   The patient is a 64 y.o. with hx HTN, DM who is here for follow-up evaluation of renal dysfunction. Patient reports hx kidney stones s/p lithotripsy. He has had ureteral stents and saw urology in the past. Has DM for 16+ years. He has hx pancreas cyst- [facility].  Was drinking some beers in the past but no longer. He has had radiation therapy for ?cancer (oral) in the past- but unable to recall details at this time. He takes potassium citrate 10 meq 4 tabs BID. He says he takes potassium citrate --has been taking it since 1990s ever since he had kidney stones surgery.  Also took mobic daily.  Has lot of arthritis in hands. No longer taking mobic.     Sept 2024: He is here for follow-up. Here for six months.  He is seeing endo. Says he is not eating correct at times. No urinary complaints. Sugars can be upto 400s at time. He says he was having trouble getting insulin covered and now able to get it. He is aware of renal problems with high sugars.     Jan 2025: Here for 4 months follow-up. No new complaints. Bp is stable.     Jan 2025: Today patient feels well. He recently had EGD. He clarifies today and reports that he was to hold jardiance for 3 days only prior to his EGD. However he is wanting to change to Farxiga. Jardiance was started by his endocrinologist but they are now asking nephrology to manage it. Patient is interested in Farxiga.       Past History:  Past Medical History:   Diagnosis Date    Cancer (HCC)     Chronic kidney disease     Diabetes

## 2025-01-23 ENCOUNTER — TELEPHONE (OUTPATIENT)
Age: 65
End: 2025-01-23

## 2025-01-23 NOTE — TELEPHONE ENCOUNTER
Download scanned into media. Pt states Nephrologist took him off of Jardiance and put him on 10mg Farxiga. Please review/advise.

## 2025-02-18 NOTE — TELEPHONE ENCOUNTER
HE SHOULD FOLLOW UP WITH NEPHROLOGY AS RECOMMENDED.  GET BLOOD SUGARS FOR MY REVIEW.   --------------  Pt states he is taking his last 3.0 mg of trulicity tomorrow, 2/19. Please advise. New download scanned into media.

## 2025-03-03 ENCOUNTER — CLINICAL DOCUMENTATION (OUTPATIENT)
Age: 65
End: 2025-03-03

## 2025-03-03 DIAGNOSIS — E11.65 TYPE 2 DIABETES MELLITUS WITH HYPERGLYCEMIA, WITHOUT LONG-TERM CURRENT USE OF INSULIN (HCC): Primary | ICD-10-CM

## 2025-03-03 NOTE — PROGRESS NOTES
I have increased Trulicity to 4.5 mg weekly.  May cause pancreatitis.  Let me know if he develops acute abdominal pain with nausea and vomiting.  Also concerns about medullary thyroid cancer.  Let me know if he develops fullness in his neck or difficulty swallowing.  Also, obtain report for the eye exam.

## 2025-03-05 ENCOUNTER — TELEPHONE (OUTPATIENT)
Age: 65
End: 2025-03-05

## 2025-03-05 NOTE — TELEPHONE ENCOUNTER
Patient called in stating it is cheaper for the patient to send the TrRevistronic 4.5 to [facility]. Can you fix this please? I have added [facility] to the patients chart.

## 2025-03-09 DIAGNOSIS — E11.65 TYPE 2 DIABETES MELLITUS WITH HYPERGLYCEMIA, WITHOUT LONG-TERM CURRENT USE OF INSULIN (HCC): ICD-10-CM

## 2025-03-18 ENCOUNTER — TELEPHONE (OUTPATIENT)
Age: 65
End: 2025-03-18

## 2025-03-18 NOTE — TELEPHONE ENCOUNTER
Patient called in as he got a call from [facility] and the Trulicity  prescription is going to be $600. Patient can't afford and will need a new prescription sent in.

## 2025-04-01 ENCOUNTER — OFFICE VISIT (OUTPATIENT)
Age: 65
End: 2025-04-01
Payer: MEDICARE

## 2025-04-01 VITALS
SYSTOLIC BLOOD PRESSURE: 138 MMHG | WEIGHT: 229 LBS | HEIGHT: 66 IN | DIASTOLIC BLOOD PRESSURE: 84 MMHG | HEART RATE: 78 BPM | BODY MASS INDEX: 36.8 KG/M2

## 2025-04-01 DIAGNOSIS — E78.2 MIXED HYPERLIPIDEMIA: ICD-10-CM

## 2025-04-01 DIAGNOSIS — E11.65 TYPE 2 DIABETES MELLITUS WITH HYPERGLYCEMIA, WITHOUT LONG-TERM CURRENT USE OF INSULIN (HCC): Primary | ICD-10-CM

## 2025-04-01 DIAGNOSIS — E03.9 ACQUIRED HYPOTHYROIDISM: ICD-10-CM

## 2025-04-01 PROCEDURE — 3046F HEMOGLOBIN A1C LEVEL >9.0%: CPT | Performed by: INTERNAL MEDICINE

## 2025-04-01 PROCEDURE — G8427 DOCREV CUR MEDS BY ELIG CLIN: HCPCS | Performed by: INTERNAL MEDICINE

## 2025-04-01 PROCEDURE — 1123F ACP DISCUSS/DSCN MKR DOCD: CPT | Performed by: INTERNAL MEDICINE

## 2025-04-01 PROCEDURE — 3075F SYST BP GE 130 - 139MM HG: CPT | Performed by: INTERNAL MEDICINE

## 2025-04-01 PROCEDURE — G8417 CALC BMI ABV UP PARAM F/U: HCPCS | Performed by: INTERNAL MEDICINE

## 2025-04-01 PROCEDURE — 99214 OFFICE O/P EST MOD 30 MIN: CPT | Performed by: INTERNAL MEDICINE

## 2025-04-01 PROCEDURE — 3017F COLORECTAL CA SCREEN DOC REV: CPT | Performed by: INTERNAL MEDICINE

## 2025-04-01 PROCEDURE — 1036F TOBACCO NON-USER: CPT | Performed by: INTERNAL MEDICINE

## 2025-04-01 PROCEDURE — 3079F DIAST BP 80-89 MM HG: CPT | Performed by: INTERNAL MEDICINE

## 2025-04-01 PROCEDURE — 2022F DILAT RTA XM EVC RTNOPTHY: CPT | Performed by: INTERNAL MEDICINE

## 2025-04-01 RX ORDER — INSULIN LISPRO 200 [IU]/ML
INJECTION, SOLUTION SUBCUTANEOUS
Qty: 45 ADJUSTABLE DOSE PRE-FILLED PEN SYRINGE | Refills: 3 | Status: SHIPPED | OUTPATIENT
Start: 2025-04-01

## 2025-04-01 RX ORDER — GLIMEPIRIDE 4 MG/1
4 TABLET ORAL 2 TIMES DAILY
Qty: 180 TABLET | Refills: 1 | Status: SHIPPED | OUTPATIENT
Start: 2025-04-01

## 2025-04-01 RX ORDER — ACYCLOVIR 400 MG/1
TABLET ORAL
Qty: 9 EACH | Refills: 1 | Status: SHIPPED | OUTPATIENT
Start: 2025-04-01

## 2025-04-01 RX ORDER — INSULIN GLARGINE 100 [IU]/ML
INJECTION, SOLUTION SUBCUTANEOUS
Qty: 45 ADJUSTABLE DOSE PRE-FILLED PEN SYRINGE | Refills: 3 | Status: SHIPPED | OUTPATIENT
Start: 2025-04-01

## 2025-04-01 NOTE — PROGRESS NOTES
Diabetes Checklist    Date: 4/1/25   Patient Name: Rich Grayson   YOB: 1960     When were you Diagnosed with Diabetes? Around 2007  Current Treatment? Insulin, Trulicity, glimepiride   Prior Treatment? Same   Diet Plan? Portion control   Exercise Plan? Not right now   Diabetes Complications? Neuropathy (Nerve Damage)    Do you experience any of the following symptoms?   Symptoms Yes No   Tingling or Numbness in Toes  [x]   []    Burning in Feet  [x] Sometimes  []    Frequent  Urination  [x]   []    Blurry Vision  [x]   []    Open Wounds on Feet  []   [x]      Do you have any other concerns today? No

## 2025-04-01 NOTE — PROGRESS NOTES
Rich Grayson is a 64 y.o. , male who comes for Initial visit for Diabetes Mellitus Type 2  PCP: Adalgisa Flores MD    HPI   History of Present Illness  The patient presents for evaluation of diabetes mellitus, hyperlipidemia, and hypothyroidism.    He is currently on a regimen of Lantus 96 units in the morning and 96 units at night, Trulicity 4.5 mg, and Humalog 44 units at breakfast, lunch, dinner, and an additional 44 units at bedtime, with a sliding scale. He has been experiencing insomnia, sleeping only 3 hours on Thursday nights and no more than 4 to 5 hours on other nights. His bedtime varies depending on his fatigue level, sometimes retiring at 10:00 PM and waking up at 5:00 AM. He does not engage in nighttime snacking. His meal times are irregular, often skipping breakfast and occasionally feeling hungry at lunchtime. His last meal can be as late as 9:00 PM, after which he stays awake until 5:00 AM. He does not adhere to a specific meal plan but practices portion control. He has not been exercising due to cold weather but plans to resume walking his dogs, covering a distance of 2.3 miles in over an hour when the weather improves. He has lost weight and reports no stomach issues, pain, nausea, vomiting, constipation, or diarrhea since starting the 3 mg dose of Trulicity. He is uncertain if Farxiga is affecting his appetite. He has experienced hypoglycemic episodes, with blood sugar levels dropping to 75 upon waking. He is not currently attending a diabetes clinic and does not wish to be hospitalized. He has noticed that his blood sugar levels remain around 129 for several hours before bedtime, and taking 44 units of insulin at this time results in hypoglycemia within 4 hours. He does not take insulin if he is not eating during the day. He is seeking refills for his Dexcom sensors and needles, which he would like sent to [facility]. He is also requesting refills for his Lantus, Humalog, and Trulicity

## 2025-04-04 LAB
T4 FREE: 1.08
TSH SERPL DL<=0.05 MIU/L-ACNC: 3.03 UIU/ML

## 2025-04-12 ENCOUNTER — RESULTS FOLLOW-UP (OUTPATIENT)
Age: 65
End: 2025-04-12

## 2025-04-23 ENCOUNTER — OFFICE VISIT (OUTPATIENT)
Dept: NEPHROLOGY | Age: 65
End: 2025-04-23
Payer: MEDICARE

## 2025-04-23 VITALS
OXYGEN SATURATION: 97 % | WEIGHT: 226 LBS | HEART RATE: 88 BPM | BODY MASS INDEX: 36.32 KG/M2 | DIASTOLIC BLOOD PRESSURE: 87 MMHG | HEIGHT: 66 IN | SYSTOLIC BLOOD PRESSURE: 137 MMHG

## 2025-04-23 DIAGNOSIS — I12.9 HYPERTENSIVE RENAL DISEASE, STAGE 1 THROUGH STAGE 4 OR UNSPECIFIED CHRONIC KIDNEY DISEASE: ICD-10-CM

## 2025-04-23 DIAGNOSIS — N28.1 RENAL CYST: ICD-10-CM

## 2025-04-23 DIAGNOSIS — N18.32 CHRONIC KIDNEY DISEASE, STAGE 3B (HCC): Primary | ICD-10-CM

## 2025-04-23 PROCEDURE — 3017F COLORECTAL CA SCREEN DOC REV: CPT | Performed by: INTERNAL MEDICINE

## 2025-04-23 PROCEDURE — 3075F SYST BP GE 130 - 139MM HG: CPT | Performed by: INTERNAL MEDICINE

## 2025-04-23 PROCEDURE — 3079F DIAST BP 80-89 MM HG: CPT | Performed by: INTERNAL MEDICINE

## 2025-04-23 PROCEDURE — 1123F ACP DISCUSS/DSCN MKR DOCD: CPT | Performed by: INTERNAL MEDICINE

## 2025-04-23 PROCEDURE — G8427 DOCREV CUR MEDS BY ELIG CLIN: HCPCS | Performed by: INTERNAL MEDICINE

## 2025-04-23 PROCEDURE — 1036F TOBACCO NON-USER: CPT | Performed by: INTERNAL MEDICINE

## 2025-04-23 PROCEDURE — 99213 OFFICE O/P EST LOW 20 MIN: CPT | Performed by: INTERNAL MEDICINE

## 2025-04-23 PROCEDURE — G8417 CALC BMI ABV UP PARAM F/U: HCPCS | Performed by: INTERNAL MEDICINE

## 2025-04-23 NOTE — PROGRESS NOTES
[facility] [place] [facility]  [facility]  [address]  [facility] [address]  Dept: [phone]  Loc: [phone]  Office Progress Note  4/23/2025 12:42 PM      Pt Name:    Rich Grayson  YOB: 1960  Primary Care Physician:  Adalgisa Flores MD     Chief Complaint:   Chief Complaint   Patient presents with    Follow-up     CKD , HTN , diuretics mgmt        Background Information/Interval History:   The patient is a 64 y.o. with hx HTN, DM who is here for follow-up evaluation of renal dysfunction. Patient reports hx kidney stones s/p lithotripsy. He has had ureteral stents and saw urology in the past. Has DM for 16+ years. He has hx pancreas cyst- [facility].  Was drinking some beers in the past but no longer. He has had radiation therapy for ?cancer (oral) in the past- but unable to recall details at this time. He takes potassium citrate 10 meq 4 tabs BID. He says he takes potassium citrate --has been taking it since 1990s ever since he had kidney stones surgery.  Also took mobic daily.  Has lot of arthritis in hands. No longer taking mobic.     Sept 2024: He is here for follow-up. Here for six months.  He is seeing endo. Says he is not eating correct at times. No urinary complaints. Sugars can be upto 400s at time. He says he was having trouble getting insulin covered and now able to get it. He is aware of renal problems with high sugars.     Jan 2025: Here for 4 months follow-up. No new complaints. Bp is stable.     Jan 2025: Today patient feels well. He recently had EGD. He clarifies today and reports that he was to hold jardiance for 3 days only prior to his EGD. However he is wanting to change to Farxiga. Jardiance was started by his endocrinologist but they are now asking nephrology to manage it. Patient is interested in Farxiga.      April 2025: doing okay. BP is okay. No urinary complaints. Sugars are \"up and down. Sugars are upto 400s at times. He

## 2025-06-05 DIAGNOSIS — E11.65 TYPE 2 DIABETES MELLITUS WITH HYPERGLYCEMIA, WITHOUT LONG-TERM CURRENT USE OF INSULIN (HCC): ICD-10-CM

## 2025-06-05 RX ORDER — ACYCLOVIR 400 MG/1
TABLET ORAL
Qty: 9 EACH | Refills: 1 | Status: SHIPPED | OUTPATIENT
Start: 2025-06-05

## 2025-07-11 ENCOUNTER — OFFICE VISIT (OUTPATIENT)
Age: 65
End: 2025-07-11
Payer: MEDICARE

## 2025-07-11 VITALS
WEIGHT: 226.4 LBS | BODY MASS INDEX: 36.38 KG/M2 | SYSTOLIC BLOOD PRESSURE: 118 MMHG | RESPIRATION RATE: 16 BRPM | HEIGHT: 66 IN | HEART RATE: 82 BPM | DIASTOLIC BLOOD PRESSURE: 74 MMHG

## 2025-07-11 DIAGNOSIS — E03.9 ACQUIRED HYPOTHYROIDISM: ICD-10-CM

## 2025-07-11 DIAGNOSIS — E78.2 MIXED HYPERLIPIDEMIA: ICD-10-CM

## 2025-07-11 DIAGNOSIS — E11.65 TYPE 2 DIABETES MELLITUS WITH HYPERGLYCEMIA, WITHOUT LONG-TERM CURRENT USE OF INSULIN (HCC): Primary | ICD-10-CM

## 2025-07-11 PROCEDURE — 99214 OFFICE O/P EST MOD 30 MIN: CPT | Performed by: INTERNAL MEDICINE

## 2025-07-11 PROCEDURE — 3046F HEMOGLOBIN A1C LEVEL >9.0%: CPT | Performed by: INTERNAL MEDICINE

## 2025-07-11 PROCEDURE — 1123F ACP DISCUSS/DSCN MKR DOCD: CPT | Performed by: INTERNAL MEDICINE

## 2025-07-11 PROCEDURE — G8417 CALC BMI ABV UP PARAM F/U: HCPCS | Performed by: INTERNAL MEDICINE

## 2025-07-11 PROCEDURE — G8427 DOCREV CUR MEDS BY ELIG CLIN: HCPCS | Performed by: INTERNAL MEDICINE

## 2025-07-11 PROCEDURE — 3074F SYST BP LT 130 MM HG: CPT | Performed by: INTERNAL MEDICINE

## 2025-07-11 PROCEDURE — 3017F COLORECTAL CA SCREEN DOC REV: CPT | Performed by: INTERNAL MEDICINE

## 2025-07-11 PROCEDURE — 3078F DIAST BP <80 MM HG: CPT | Performed by: INTERNAL MEDICINE

## 2025-07-11 PROCEDURE — 2022F DILAT RTA XM EVC RTNOPTHY: CPT | Performed by: INTERNAL MEDICINE

## 2025-07-11 PROCEDURE — 1036F TOBACCO NON-USER: CPT | Performed by: INTERNAL MEDICINE

## 2025-07-11 RX ORDER — LEVOTHYROXINE SODIUM 75 UG/1
75 TABLET ORAL DAILY
Qty: 90 TABLET | Refills: 1 | Status: SHIPPED | OUTPATIENT
Start: 2025-07-11

## 2025-07-11 RX ORDER — DAPAGLIFLOZIN 10 MG/1
10 TABLET, FILM COATED ORAL EVERY MORNING
Qty: 14 TABLET | Refills: 0 | Status: SHIPPED | OUTPATIENT
Start: 2025-07-11

## 2025-07-11 RX ORDER — INSULIN GLARGINE 100 [IU]/ML
INJECTION, SOLUTION SUBCUTANEOUS
Qty: 45 ADJUSTABLE DOSE PRE-FILLED PEN SYRINGE | Refills: 3 | Status: SHIPPED | OUTPATIENT
Start: 2025-07-11

## 2025-07-11 RX ORDER — INSULIN LISPRO 200 [IU]/ML
INJECTION, SOLUTION SUBCUTANEOUS
Qty: 45 ADJUSTABLE DOSE PRE-FILLED PEN SYRINGE | Refills: 3 | Status: SHIPPED | OUTPATIENT
Start: 2025-07-11

## 2025-07-11 NOTE — PROGRESS NOTES
Diabetes Checklist    Date: 7/11/25   Patient Name: Rich Grayson   YOB: 1960     When were you Diagnosed with Diabetes? 2007  Current Treatment? Lantus & Humalog & trulicity & Glimepiride   Prior Treatment? Metformin   Diet Plan? Portion Control:    Exercise Plan? Walking dog  Diabetes Complications? Neuropathy (Nerve Damage)     Do you experience any of the following symptoms?   Symptoms Yes No   Tingling or Numbness in Toes  [x]   []    Burning in Feet  [x]   []    Frequent  Urination  [x]   []    Blurry Vision  [x]   []    Open Wounds on Feet  []   [x]      Do you have any other concerns today? none   
contact.  Affect is normal.        Assessment/Plan:  Assessment & Plan  1. Diabetes Mellitus: Inadequately controlled  - Blood glucose levels consistently high, ranging from 250 to 300 postprandially  - Discussed transitioning to a concentrated insulin regimen and potential benefits of an insulin pump  - Advised to administer insulin injections 15 minutes prior to meals for the next two weeks  - Provide a download of blood sugar readings at the end of this period  - Increased Lantus dosage to 100 units twice daily  - Prescriptions sent to pharmacy: Trulicity, Farxiga 10 mg, Lantus 100 units twice daily, Humalog 44 units four times daily, thyroid medication 75 mcg  - Ordered comprehensive panel, A1c, and cholesterol tests to be done while fasting  - Consider initiation of concentrated insulin if blood sugar levels remain high despite correct insulin administration    2. Hyperlipidemia  - Cholesterol levels last checked in 09/2024, not within normal range  - Ordered comprehensive panel, A1c, and cholesterol tests to be done while fasting    3. Thyroid disorder  - Prescription for thyroid medication 75 mcg sent to pharmacy    Follow-up  - Patient to follow up in 3 months     1. Type 2 diabetes mellitus with hyperglycemia, without long-term current use of insulin (HCC)    2. Mixed hyperlipidemia    3. Acquired hypothyroidism      Orders Placed This Encounter   Procedures    Hemoglobin A1C     Standing Status:   Future     Expected Date:   7/25/2025     Expiration Date:   10/11/2025    Lipid Panel     Standing Status:   Future     Expected Date:   7/25/2025     Expiration Date:   10/11/2025    Comprehensive Metabolic Panel     Standing Status:   Future     Expected Date:   7/25/2025     Expiration Date:   10/11/2025           The risks and benefits of my recommendations, as well as other treatment options were discussed with the patient today. Questions were answered.  Follow up: 3 months and as needed.

## 2025-07-14 LAB
ALBUMIN: 4.3 G/DL
ALP BLD-CCNC: 64 U/L
ALT SERPL-CCNC: 39 U/L
ANION GAP SERPL CALCULATED.3IONS-SCNC: 14 MMOL/L
AST SERPL-CCNC: 38 U/L
BILIRUB SERPL-MCNC: 0.6 MG/DL (ref 0.1–1.4)
BUN BLDV-MCNC: 39 MG/DL
CALCIUM SERPL-MCNC: 9.8 MG/DL
CHLORIDE BLD-SCNC: 98 MMOL/L
CHOLESTEROL, TOTAL: 205 MG/DL
CHOLESTEROL/HDL RATIO: 5.3
CO2: 30 MMOL/L
CREAT SERPL-MCNC: 2.42 MG/DL
ESTIMATED AVERAGE GLUCOSE: 209
GFR, ESTIMATED: 29
GLUCOSE BLD-MCNC: 64 MG/DL
HBA1C MFR BLD: 8.9 %
HDLC SERPL-MCNC: 39 MG/DL (ref 35–70)
LDL CHOLESTEROL: 129
NONHDLC SERPL-MCNC: ABNORMAL MG/DL
POTASSIUM SERPL-SCNC: 4.1 MMOL/L
SODIUM BLD-SCNC: 138 MMOL/L
TOTAL PROTEIN: 8.1 G/DL (ref 6.4–8.2)
TRIGL SERPL-MCNC: 184 MG/DL
VLDLC SERPL CALC-MCNC: 37 MG/DL

## 2025-07-26 ENCOUNTER — CLINICAL DOCUMENTATION (OUTPATIENT)
Age: 65
End: 2025-07-26

## 2025-07-26 NOTE — PROGRESS NOTES
A1c is slightly improved but not yet normal.  Cholesterol slightly improved as well but not yet at target.  Confirm if he is taking Lipitor as prescribed.  If so then I need to increase the dose.